# Patient Record
Sex: MALE | Race: WHITE | NOT HISPANIC OR LATINO | Employment: FULL TIME | URBAN - METROPOLITAN AREA
[De-identification: names, ages, dates, MRNs, and addresses within clinical notes are randomized per-mention and may not be internally consistent; named-entity substitution may affect disease eponyms.]

---

## 2016-12-21 ASSESSMENT — ACTIVITIES OF DAILY LIVING (ADL)
ARE_THERE_CARBON_MONOXIDE_DETECTORS_IN_YOUR_HOME?: Y
DO_MEMBERS_OF_YOUR_HOUSEHOLD_WEAR_SEAT_BELTS?: Y
ARE_THERE_SMOKE_DETECTORS_IN_YOUR_HOME?: Y
DO_MEMBERS_OF_YOUR_HOUSEHOLD_USE_SUNSCREEN?: Y
ARE_THERE_FIREARMS_IN_YOUR_HOME?: N
DO_MEMBERS_OF_YOUR_HOUSEHOLD_USE_SAFETY_HELMETS?: N

## 2017-01-04 ENCOUNTER — OFFICE VISIT (OUTPATIENT)
Dept: INTERNAL MEDICINE | Facility: CLINIC | Age: 57
End: 2017-01-04

## 2017-01-04 VITALS
BODY MASS INDEX: 30.51 KG/M2 | HEIGHT: 69 IN | HEART RATE: 58 BPM | WEIGHT: 206 LBS | RESPIRATION RATE: 16 BRPM | DIASTOLIC BLOOD PRESSURE: 84 MMHG | SYSTOLIC BLOOD PRESSURE: 129 MMHG

## 2017-01-04 DIAGNOSIS — Z11.59 NEED FOR HEPATITIS C SCREENING TEST: ICD-10-CM

## 2017-01-04 DIAGNOSIS — Z13.220 SCREENING CHOLESTEROL LEVEL: ICD-10-CM

## 2017-01-04 DIAGNOSIS — Z11.59 NEED FOR HEPATITIS C SCREENING TEST: Primary | ICD-10-CM

## 2017-01-04 DIAGNOSIS — Z23 NEED FOR PROPHYLACTIC VACCINATION AND INOCULATION AGAINST INFLUENZA: ICD-10-CM

## 2017-01-04 LAB
CHOLEST SERPL-MCNC: 206 MG/DL
HCV AB SERPL QL IA: NORMAL
HDLC SERPL-MCNC: 50 MG/DL
LDLC SERPL CALC-MCNC: 136 MG/DL
NONHDLC SERPL-MCNC: 156 MG/DL
TRIGL SERPL-MCNC: 102 MG/DL

## 2017-01-04 ASSESSMENT — PAIN SCALES - GENERAL: PAINLEVEL: NO PAIN (0)

## 2017-01-04 NOTE — PATIENT INSTRUCTIONS
Primary Care Center Medication Refill Request Information:  * Please contact your pharmacy regarding ANY request for medication refills.  ** [facility] Prescription Fax = [phone]  * Please allow 3 business days for routine medication refills.  * Please allow 5 business days for controlled substance medication refills.     Primary Care Center Test Result notification information:  *You will be notified with in 7-10 days of your appointment day regarding the results of your test.  If you are on MyChart you will be notified as soon as the provider has reviewed the results and signed off on them.

## 2017-01-04 NOTE — MR AVS SNAPSHOT
After Visit Summary   1/4/2017    Bernabe Dykes    MRN: [id number]           Patient Information     Date Of Birth          1960        Visit Information        Provider Department      1/4/2017 7:40 AM Josue Todd MD [facility]        Today's Diagnoses     Need for hepatitis C screening test    -  1     Need for prophylactic vaccination and inoculation against influenza         Screening cholesterol level           Care Instructions    Primary Care Center Medication Refill Request Information:  * Please contact your pharmacy regarding ANY request for medication refills.  ** [facility] Prescription Fax = [phone]  * Please allow 3 business days for routine medication refills.  * Please allow 5 business days for controlled substance medication refills.     Primary Care Center Test Result notification information:  *You will be notified with in 7-10 days of your appointment day regarding the results of your test.  If you are on MyChart you will be notified as soon as the provider has reviewed the results and signed off on them.          Follow-ups after your visit        Your next 10 appointments already scheduled     Jan 04, 2017  8:30 AM   LAB with [facility] ([facility])    [address] [place] [address] [phone]           Patient must bring picture ID.  Patient should be prepared to give a urine specimen  Please do not eat 10-12 hours before your appointment if you are coming in fasting for labs on lipids, cholesterol, or glucose (sugar).  Pregnant women should follow their Care Team instructions. Water with medications is okay. Do not drink coffee or other fluids.   If you have concerns about taking  your medications, please ask at office or if scheduling via [employer], send a message by clicking on Secure Messaging, Message Your Care Team.              Future tests that were ordered for you  "today     Open Future Orders        Priority Expected Expires Ordered    Hepatitis C Screen Reflex to HCV RNA Quant and Genotype Routine 1/4/2017 1/4/2018 1/4/2017    Lipid panel reflex to direct LDL Routine  1/4/2018 1/4/2017            Who to contact     Please call your clinic at [phone] to:    Ask questions about your health    Make or cancel appointments    Discuss your medicines    Learn about your test results    Speak to your doctor   If you have compliments or concerns about an experience at your clinic, or if you wish to file a complaint, please contact [facility] Patient Relations at [phone] or email us at [email or web address]         Additional Information About Your Visit        [employer] Information     [employer] gives you secure access to your electronic health record. If you see a primary care provider, you can also send messages to your care team and make appointments. If you have questions, please call your primary care clinic.  If you do not have a primary care provider, please call [phone] and they will assist you.      [employer] is an electronic gateway that provides easy, online access to your medical records. With [employer], you can request a clinic appointment, read your test results, renew a prescription or communicate with your care team.     To access your existing account, please contact your [facility] or call [phone] for assistance.        Care EveryWhere ID     This is your Care EveryWhere ID. This could be used by other organizations to access your [place] medical records  [id number]        Your Vitals Were     Pulse Respirations Height BMI (Body Mass Index)          58 16 1.765 m (5' 9.49\") 29.99 kg/m2         Blood Pressure from Last 3 Encounters:   01/04/17 129/84   03/16/16 138/93   12/31/15 129/85    Weight from Last 3 Encounters:   01/04/17 93.441 kg (206 lb)   03/16/16 96.163 kg (212 lb) "   12/31/15 95.709 kg (211 lb)              We Performed the Following     FLU VACCINE, 3 YRS +, IM  [[id number]]        Primary Care Provider Office Phone # Fax #    Michael Ruff [phone]        [facility] [address] [facility] [address]  [facility] [address]        Thank you!     Thank you for choosing [facility]  for your care. Our goal is always to provide you with excellent care. Hearing back from our patients is one way we can continue to improve our services. Please take a few minutes to complete the written survey that you may receive in the mail after your visit with us. Thank you!             Your Updated Medication List - Protect others around you: Learn how to safely use, store and throw away your medicines at www.disposemymeds.org.          This list is accurate as of: 1/4/17  8:02 AM.  Always use your most recent med list.                   Brand Name Dispense Instructions for use    aspirin 81 MG EC tablet     90 tablet    Take 1 tablet (81 mg) by mouth daily

## 2017-01-04 NOTE — NURSING NOTE
Chief Complaint   Patient presents with     Physical     Pt is here for a physical.      Michaela Farmer LPN January 4, 2017 7:36 AM

## 2017-01-04 NOTE — NURSING NOTE
"Administered flu vaccine to patient. Patient tolerated procedure well. See immunization records for details.   FLU VACCINE QUESTIONNAIRE:  Ask the following questions of all parties who want influenza vaccination:     CONTRAINDICATIONS  1.  Is the patient age less than 6 months?  NO  2.  Has the person to be vaccinated ever had Guillain-Carpio syndrome? NO  3.  Has the person to be vaccinated had the vaccine this year? NO  4.  Is the person to be vaccinated sick today? NO  5.  Does the person to be vaccinated have an allergy to eggs or a component of the vaccine? NO  6.  Has the person to vaccinated ever had a serious reaction to an influenza vaccination in the past? NO    If the answer to ALL of the above questions is \"No\", then please administer the influenza vaccine per the standard protocol.  If the patient answered \"Yes\" to questions 1 or 2, do not administer the vaccine. If the patient answered \"Yes\" to question 3, do not administer the vaccine unless the patient is a child receiving the vaccine in two doses. If the patient answered \"Yes\" to questions 4, 5, and/or 6, get additional details on sickness and/or reaction and refer to provider. If you have any questions regarding contraindications, please refer to the provider.                                                         INFLUENZA VACCINATION NOTE      Information sheet given to patient and questions answered.   INDICATION FOR VACCINATION:  Anyone from 6 months of age or older.    MELBA Farmer LPN     "

## 2017-01-04 NOTE — PROGRESS NOTES
[facility] Resident Note    Date of visit: January 4, 2017    Reason for visit: Physical    HPI: Bernabe Dykes is a 56 year old male with a history of unprovoked DVT in 2005 and 2012 who presents to clinic for physical.     No complaints today.    Last seen by Dr. Todd on 3/16/16 for snoring that was thought to be related to nasal obstruction and possible septal deviation. Advised to try Breathe Right strips and fluticasone. CT scan of sinuses was ordered but not completed. Patient notes that snoring resolved in the spring and he thought that it might have been due to seasonal/spring allergies. Since the spring, he has not been using Breathe Right strips or fluticasone. Also, during the 3/16/16 visit, he noted having reduced libido, increased latency or erections, and difficulty maintaining erections. Afternoon/early evening testosterone was checked and was normal. Today, no concerns with libido or ED. Notes that reduced libido and ED could have been due to the increased stress that he had in the spring.    Review Of Systems  Answers for HPI/ROS submitted by the patient on 12/21/2016   Annual Exam:  General Symptoms: No  Skin Symptoms: No  HENT Symptoms: No  EYE SYMPTOMS: No  HEART SYMPTOMS: No  LUNG SYMPTOMS: No  INTESTINAL SYMPTOMS: No  URINARY SYMPTOMS: No  REPRODUCTIVE SYMPTOMS: No  SKELETAL SYMPTOMS: No  BLOOD SYMPTOMS: No  NERVOUS SYSTEM SYMPTOMS: No  MENTAL HEALTH SYMPTOMS: No  Frequency of exercise:: 2-3 days/week  Duration of exercise:: 15-30 minutes        Past Medical History   Diagnosis Date     DVT of lower extremity (deep venous thrombosis) (H)      2005 and 2012     Past Surgical History   Procedure Laterality Date     Orthopedic surgery  1978     left knee repair     Orthopedic surgery       left wrist repair, metal      Colonoscopy  2/17/2014     Procedure: COLONOSCOPY;;  Surgeon: Nathan Olmedo MD;  Location:  GI     Social History     Social History     Marital Status:      Spouse  "Name: N/A     Number of Children: N/A     Years of Education: N/A     Occupational History     Not on file.     Social History Main Topics     Smoking status: Never Smoker      Smokeless tobacco: Never Used     Alcohol Use: Yes      Comment: 10 glass of wine a week     Drug Use: No     Sexual Activity: Not on file     Other Topics Concern     Not on file     Social History Narrative     Family History   Problem Relation Age of Onset     Coronary Artery Disease Father 54     CABG, ICD     DIABETES Mother      Obesity Mother      Obesity Father      Current Outpatient Prescriptions   Medication Sig Dispense Refill     aspirin 81 MG EC tablet Take 1 tablet (81 mg) by mouth daily 90 tablet 3     No Known Allergies    Physical Exam:   Vitals: /84 mmHg  Pulse 58  Resp 16  Ht 1.765 m (5' 9.49\")  Wt 93.441 kg (206 lb)  BMI 29.99 kg/m2  Gen: Alert, oriented, pleasant, NAD  HEENT: NC/AT, PERRL, EOMI, fundoscopic exam showed normal blood vessels without hemorrhage or exudates, moist mucous membranes, oropharynx w/o erythema, exudate, or lesions. Clear TM bilaterally.  Neck: No cervical lymphadenopathy, no thyromegaly. No carotid bruits.  CV: RRR, normal S1 and S2, no murmurs, 2+ bilateral radial pulses  Respiratory: CTAB, good air movement bilaterally, no wheezes, rales, or rhonchi  Abdomen: Soft, NTND. BS present. No palpable masses. No hepatosplenomegaly.  Back: No vertebral body tenderness  /Rectal: No inguinal hernias. Normal prostate, no palpable masses  Extremities: No LE edema. No knee joint swelling. 5/5 bilateral  strength and 5/5 bilateral upper extremity strength  Neuro: CN II-XII grossly intact, moves all extremities. 2+ bilateral patellar reflexes. Normal heel and toe walking. Negative Romberg.  Skin: No visible rashes      Assessment/Plan:   Bernabe Dykes is a 56 year old male with a history of unprovoked DVT in 2005 and 2012 who presents to clinic for physical and has no " complaints.    Screening cholesterol level: Last lipid panel in 12/2015. 10-year ASCVD 5.7%. Discussed starting a statin with patient in 3/2016 for borderline HLD, which he declined. Will recheck fasting lipid panel today.  -     Lipid panel reflex to direct LDL; Future    Possible seasonal allergies: Had snoring in the spring, possibly due to seasonal allergies and nasal congestion. Snoring improved with Breathe Right strips and fluticasone and when the season changed. Advised patient to try using fluticasone earlier in the spring, a little before onset of symptoms.      Health maintenance:   Need for hepatitis C screening test  -     Hepatitis C Screen Reflex to HCV RNA Quant and Genotype; Future  Need for prophylactic vaccination and inoculation against influenza  -     FLU VACCINE, 3 YRS +, IM  [34274]  Advance Directive: Patient notes that he has a legal copy of an advance directive. Advised patient to bring a copy to clinic the next time he has a visit.    RTC in 1 year.      Patient was seen and discussed with my attending physician, Dr. Todd.       Kerline Bautista MD, PhD PGY1  Internal Medicine  Pager: [phone]  Pt was seen and examined with Dr. Bautista.  I agree with her documentation as noted above.    My additional comments: None    Josue Todd

## 2017-09-25 ENCOUNTER — OFFICE VISIT (OUTPATIENT)
Dept: INTERNAL MEDICINE | Facility: CLINIC | Age: 57
End: 2017-09-25

## 2017-09-25 VITALS — HEART RATE: 79 BPM | DIASTOLIC BLOOD PRESSURE: 91 MMHG | SYSTOLIC BLOOD PRESSURE: 131 MMHG

## 2017-09-25 DIAGNOSIS — H92.01 EAR PAIN, RIGHT: Primary | ICD-10-CM

## 2017-09-25 RX ORDER — NEOMYCIN SULFATE, POLYMYXIN B SULFATE, HYDROCORTISONE 3.5; 10000; 1 MG/ML; [USP'U]/ML; MG/ML
3 SOLUTION/ DROPS AURICULAR (OTIC) 4 TIMES DAILY
Qty: 10 ML | Refills: 0 | Status: SHIPPED | OUTPATIENT
Start: 2017-09-25 | End: 2017-10-03

## 2017-09-25 ASSESSMENT — PAIN SCALES - GENERAL: PAINLEVEL: NO PAIN (1)

## 2017-09-25 NOTE — PATIENT INSTRUCTIONS
[facility]: [phone]     [facility] Medication Refill Request Information:  * Please contact your pharmacy regarding ANY request for medication refills.  ** [facility] Prescription Fax = [phone]  * Please allow 3 business days for routine medication refills.  * Please allow 5 business days for controlled substance medication refills.     [facility] Test Result notification information:  *You will be notified with in 7-10 days of your appointment day regarding the results of your test.  If you are on MyChart you will be notified as soon as the provider has reviewed the results and signed off on them.

## 2017-09-25 NOTE — PROGRESS NOTES
HPI:   Bernabe Dykes is a 57 year old male presents today for followup. He is generally healthy other than an unprovoked DVT many years back which is only on aspirin. For the last week he's noticed some right ear pain. It hurts near the opening of the ear canal and then he noted some pain and swelling in the retroauricular area. No deep ear pain no fevers or chills no sore throat or other URI symptoms uses a Q-tip sometimes in his ear but not very often he's not placed anything else in his ear. He wasn't was in [place] for business recently traveling. He's otherwise been feeling well. He is a nonsmoker. No hearing loss or tinnitus no discharge from his ear      ASSESSMENT/PLAN: #1 right ear pain on exam -he hat area of irritation in the external auditory canal that is near the pinna inferiorly. He is painful in this area; the rest of the external auditory canal appears normal and his tympanic membrane does not suggest any acute otitis media. He has small amount of swelling retroauricularly with what feels like a small lymph node that is somewhat tender to touch; there is no overlying redness. He has no other cervical lymphadenopathy. Oropharynx is clear. I'm going to treat this as if this is otitis externa with Corticosporin otic solutions. I asked most likely the retroauricular lymph node is inflammation related to this. I've asked him to follow up in 1 week for recheck if he still having symptoms and if things have resolved he does not need to return. He should call back if symptoms worsen before the next week.            Patient Active Problem List   Diagnosis     Skin exam, screening for cancer       Current Outpatient Prescriptions   Medication Sig Dispense Refill     aspirin 81 MG EC tablet Take 1 tablet (81 mg) by mouth daily 90 tablet 3         ROS:    Constitutional: no fevers, chill   Cardiovascular: no chest pain, palpitations  Respiratory: no dyspnea, cough, shortness of breath or wheezing   GI: no  nausea, vomiting, diarrhea, no abdominal pain   Musculoskeletal: no edema       PHYSICAL EXAM:   BP (!) 131/91  Pulse 79   Wt Readings from Last 1 Encounters:   01/04/17 93.4 kg (206 lb)       Constitutional: no distress, comfortable, pleasant    Left TM normal, EAC normal. Right TM normal. ARea of irritation and erosion in proximal EAC inferiorly, area of tenderness. Small retroauricular lymphnode that is tender to touch, minimally swollen, no overlying redness.  OP clear  No cervical lymphadenopathy  Cardiovascular: regular rate and rhythm, normal S1 and S2, no murmurs, rubs or gallops  Respiratory: clear to auscultation, no wheezes or crackles, normal breath sounds   Musculoskeletal:  no edema       Teena Chaudhry MD

## 2017-09-25 NOTE — MR AVS SNAPSHOT
After Visit Summary   9/25/2017    Bernabe Dykes    MRN: [id number]           Patient Information     Date Of Birth          1960        Visit Information        Provider Department      9/25/2017 6:30 PM Teena Chaudhry MD [facility]        Today's Diagnoses     Ear pain, right    -  1      Care Instructions    Primary Care Center: [phone]     Primary Care Center Medication Refill Request Information:  * Please contact your pharmacy regarding ANY request for medication refills.  ** [facility] Prescription Fax = [phone]  * Please allow 3 business days for routine medication refills.  * Please allow 5 business days for controlled substance medication refills.     Primary Care Center Test Result notification information:  *You will be notified with in 7-10 days of your appointment day regarding the results of your test.  If you are on MyChart you will be notified as soon as the provider has reviewed the results and signed off on them.            Follow-ups after your visit        Follow-up notes from your care team     Return in about 1 week (around 10/2/2017).      Your next 10 appointments already scheduled     Oct 03, 2017  3:00 PM CDT   (Arrive by 2:45 PM)   Return Visit with Real Gonzales DO   [facility] ([facility])    [address] [place] [address] [phone]              Who to contact     Please call your clinic at [phone] to:    Ask questions about your health    Make or cancel appointments    Discuss your medicines    Learn about your test results    Speak to your doctor   If you have compliments or concerns about an experience at your clinic, or if you wish to file a complaint, please contact [facility] Patient Relations at [phone] or email us at [email or web address]         Additional Information About Your Visit        MyChart  Information     [employer] gives you secure access to your electronic health record. If you see a primary care provider, you can also send messages to your care team and make appointments. If you have questions, please call your primary care clinic.  If you do not have a primary care provider, please call [phone] and they will assist you.      [employer] is an electronic gateway that provides easy, online access to your medical records. With [employer], you can request a clinic appointment, read your test results, renew a prescription or communicate with your care team.     To access your existing account, please contact your [facility] or call [phone] for assistance.        Care EveryWhere ID     This is your Care EveryWhere ID. This could be used by other organizations to access your [place] medical records  [id number]        Your Vitals Were     Pulse                   79            Blood Pressure from Last 3 Encounters:   09/25/17 (!) 131/91   01/04/17 129/84   03/16/16 (!) 138/93    Weight from Last 3 Encounters:   01/04/17 93.4 kg (206 lb)   03/16/16 96.2 kg (212 lb)   12/31/15 95.7 kg (211 lb)              Today, you had the following     No orders found for display         Today's Medication Changes          These changes are accurate as of: 9/25/17  7:07 PM.  If you have any questions, ask your nurse or doctor.               Start taking these medicines.        Dose/Directions    neomycin-polymyxin-hydrocortisone otic solution   Commonly known as:  CORTISPORIN   Used for:  Ear pain, right   Started by:  Teena Chaudhry MD        Dose:  3 drop   Place 3 drops in ear(s) 4 times daily   Quantity:  10 mL   Refills:  0            Where to get your medicines      These medications were sent to [facility] [id number] [place] [address] & [facility]  [address] [place] [address]     Phone:  [phone]      neomycin-polymyxin-hydrocortisone otic solution                Primary Care Provider Office Phone # Fax #    Michael Ruff [phone]       [address] [place] [address]        Equal Access to Services     DESIREE DENTON : Barbie antonio sandieo Sogilbertoali, waaxda luqadaha, qaybta kaalmada adeegyada, rosalinda short gardenia moise. [facility] [phone].    ATENCIÓN: Si habla español, tiene a hidalgo disposición servicios gratuitos de asistencia lingüística. Llame al [phone].    We comply with applicable federal civil rights laws and [place] laws. We do not discriminate on the basis of race, color, national origin, age, disability sex, sexual orientation or gender identity.            Thank you!     Thank you for choosing [facility]  for your care. Our goal is always to provide you with excellent care. Hearing back from our patients is one way we can continue to improve our services. Please take a few minutes to complete the written survey that you may receive in the mail after your visit with us. Thank you!             Your Updated Medication List - Protect others around you: Learn how to safely use, store and throw away your medicines at www.disposemymeds.org.          This list is accurate as of: 9/25/17  7:07 PM.  Always use your most recent med list.                   Brand Name Dispense Instructions for use Diagnosis    aspirin 81 MG EC tablet     90 tablet    Take 1 tablet (81 mg) by mouth daily    Routine health maintenance       neomycin-polymyxin-hydrocortisone otic solution    CORTISPORIN    10 mL    Place 3 drops in ear(s) 4 times daily    Ear pain, right

## 2017-09-25 NOTE — NURSING NOTE
Chief Complaint   Patient presents with     Otalgia     Patient here for right ear pain x7-10 days.

## 2017-09-27 ENCOUNTER — OFFICE VISIT (OUTPATIENT)
Dept: FAMILY MEDICINE | Facility: CLINIC | Age: 57
End: 2017-09-27

## 2017-09-27 VITALS
HEART RATE: 61 BPM | RESPIRATION RATE: 18 BRPM | WEIGHT: 211.2 LBS | OXYGEN SATURATION: 97 % | DIASTOLIC BLOOD PRESSURE: 88 MMHG | SYSTOLIC BLOOD PRESSURE: 137 MMHG | TEMPERATURE: 97.8 F | BODY MASS INDEX: 30.75 KG/M2

## 2017-09-27 DIAGNOSIS — K11.21 PAROTITIS, ACUTE: Primary | ICD-10-CM

## 2017-09-27 ASSESSMENT — PAIN SCALES - GENERAL: PAINLEVEL: MILD PAIN (2)

## 2017-09-27 NOTE — PROGRESS NOTES
SUBJECTIVE:    Pt is a 57 year old male with pmh of     Patient Active Problem List   Diagnosis     Skin exam, screening for cancer       who is here for evaluation of had concerns including Ear Problem.    See Dr Chaudhry note. Tried the ear drops. Ear still irritated feeling, and dull discomfort, not really ear ache, now also in front of ear along w/ some new swelling in front of ear. At first visit, he just noted a external ear canal soreness, maybe from wearing ear buds on long flight from [place] but unsure, and a little sore spot under/behind ear. No swimming.    No systemic sx, No runny nose/sore throat. No L sx.    No Known Allergies        Current Outpatient Prescriptions   Medication Sig Dispense Refill     amoxicillin-clavulanate (AUGMENTIN) 875-125 MG per tablet Take 1 tablet by mouth 2 times daily 20 tablet 0     aspirin 81 MG EC tablet Take 1 tablet (81 mg) by mouth daily 90 tablet 3     neomycin-polymyxin-hydrocortisone (CORTISPORIN) otic solution Place 3 drops in ear(s) 4 times daily (Patient not taking: Reported on 9/27/2017) 10 mL 0       Social History   Substance Use Topics     Smoking status: Never Smoker     Smokeless tobacco: Never Used     Alcohol use Yes      Comment: 10 glass of wine a week         OBJECTIVE:  /88  Pulse 61  Temp 97.8  F (36.6  C) (Oral)  Resp 18  Wt 95.8 kg (211 lb 3.2 oz)  SpO2 97%  BMI 30.75 kg/m2  GENERAL APPEARANCE: Alert, no acute distress  EYES: PERRL, EOM normal, conjunctiva and lids normal  HENT: Ears and TMs normal on left, on right ear canal mildly red diffusely, slight white drg in areas, TM clear, oral mucosa and posterior oropharynx normal. Right ear tragus sliglty swollen and tender, and just under, under/behind, and in front of right ear the skin is normal but mildly swollen and tender, I could not palpate discrete areas of tenderness nor mass/nodule  NECK: No adenopathy,masses or thyromegaly  NEURO: Alert, oriented, speech and mentation  normal  PSYCHE: mentation appears normal, affect and mood normal    ASSESSMENT/PLAN:    Ext otitits right--whether the appearance is due to the drops, or an underlying condition, is unclear. He'll stop them.    Swollen/tender tissue around right ear; the area just in front of the ear, and under, could be his parotid if mildly inflamed. But the canal and tragus would be separate. Possiblities are parotid gland infection, if so likely bacterial as just one side, and/or ext otitis s/ spreading soft tissue infection. Uptodate on parotid gland infection rvwd. Does not need IV. Will start w/ more limited coverage, full po would be clinda plus cipro. Will start w/ Augmentin.    He has f/u planned in five days. If not better, consider CT and/or ENT to see him.    BJ MARTINEZ MD

## 2017-09-27 NOTE — PATIENT INSTRUCTIONS
[facility]: [phone]     [facility] Medication Refill Request Information:  * Please contact your pharmacy regarding ANY request for medication refills.  ** [facility] Prescription Fax = [phone]  * Please allow 3 business days for routine medication refills.  * Please allow 5 business days for controlled substance medication refills.     [facility] Test Result notification information:  *You will be notified with in 7-10 days of your appointment day regarding the results of your test.  If you are on MyChart you will be notified as soon as the provider has reviewed the results and signed off on them.

## 2017-09-27 NOTE — MR AVS SNAPSHOT
After Visit Summary   9/27/2017    Bernabe Dykes    MRN: [id number]           Patient Information     Date Of Birth          1960        Visit Information        Provider Department      9/27/2017 12:05 PM Rosalio Zuleta MD [facility]        Today's Diagnoses     Parotitis, acute    -  1      Care Instructions    Primary Care Center: [phone]     Primary [facility] Center Medication Refill Request Information:  * Please contact your pharmacy regarding ANY request for medication refills.  ** [facility] Prescription Fax = [phone]  * Please allow 3 business days for routine medication refills.  * Please allow 5 business days for controlled substance medication refills.     [facility] Center Test Result notification information:  *You will be notified with in 7-10 days of your appointment day regarding the results of your test.  If you are on MyChart you will be notified as soon as the provider has reviewed the results and signed off on them.            Follow-ups after your visit        Your next 10 appointments already scheduled     Oct 03, 2017  3:00 PM CDT   (Arrive by 2:45 PM)   Return Visit with DO PENNY Mock [facility] ([facility])    [address] [place] [id number] [phone]              Who to contact     Please call your clinic at [phone] to:    Ask questions about your health    Make or cancel appointments    Discuss your medicines    Learn about your test results    Speak to your doctor   If you have compliments or concerns about an experience at your clinic, or if you wish to file a complaint, please contact [facility] Patient Relations at [phone] or email us at [email or web address]         Additional Information About Your Visit        MyChart Information     [employer] gives you secure access to your electronic health record. If  you see a primary care provider, you can also send messages to your care team and make appointments. If you have questions, please call your primary care clinic.  If you do not have a primary care provider, please call [phone] and they will assist you.      [employer] is an electronic gateway that provides easy, online access to your medical records. With [employer], you can request a clinic appointment, read your test results, renew a prescription or communicate with your care team.     To access your existing account, please contact your [facility] or call [phone] for assistance.        Care EveryWhere ID     This is your Care EveryWhere ID. This could be used by other organizations to access your [place] medical records  [id number]        Your Vitals Were     Pulse Temperature Respirations Pulse Oximetry BMI (Body Mass Index)       61 97.8  F (36.6  C) (Oral) 18 97% 30.75 kg/m2        Blood Pressure from Last 3 Encounters:   09/27/17 137/88   09/25/17 (!) 131/91   01/04/17 129/84    Weight from Last 3 Encounters:   09/27/17 95.8 kg (211 lb 3.2 oz)   01/04/17 93.4 kg (206 lb)   03/16/16 96.2 kg (212 lb)              Today, you had the following     No orders found for display         Today's Medication Changes          These changes are accurate as of: 9/27/17 12:58 PM.  If you have any questions, ask your nurse or doctor.               Start taking these medicines.        Dose/Directions    amoxicillin-clavulanate 875-125 MG per tablet   Commonly known as:  AUGMENTIN   Used for:  Parotitis, acute   Started by:  Rosalio Zuleta MD        Dose:  1 tablet   Take 1 tablet by mouth 2 times daily   Quantity:  20 tablet   Refills:  0            Where to get your medicines      These medications were sent to [place], [place] - [address] [address]  [address] [place] [address] [address] [place] [address]    Hours:  TRANSPLANT PHONE NUMBER  [phone] Phone:  [phone]     amoxicillin-clavulanate 875-125 MG per tablet                Primary Care Provider Office Phone # Fax #    Josue Todd [phone]       [address] [place] [address]        Equal Access to Services     DESIREE DENTON : Hadii aad ku hadasho Soomaali, waaxda luqadaha, qaybta kaalmada adeegyada, waxay idiin hayaan adeeg kharash la'aan . So [facility] [phone].    ATENCIÓN: Si habla español, tiene a hidalgo disposición servicios gratuitos de asistencia lingüística. Mauriame al [phone].    We comply with applicable federal civil rights laws and [place] laws. We do not discriminate on the basis of race, color, national origin, age, disability sex, sexual orientation or gender identity.            Thank you!     Thank you for choosing [facility]  for your care. Our goal is always to provide you with excellent care. Hearing back from our patients is one way we can continue to improve our services. Please take a few minutes to complete the written survey that you may receive in the mail after your visit with us. Thank you!             Your Updated Medication List - Protect others around you: Learn how to safely use, store and throw away your medicines at www.disposemymeds.org.          This list is accurate as of: 9/27/17 12:58 PM.  Always use your most recent med list.                   Brand Name Dispense Instructions for use Diagnosis    amoxicillin-clavulanate 875-125 MG per tablet    AUGMENTIN    20 tablet    Take 1 tablet by mouth 2 times daily    Parotitis, acute       aspirin 81 MG EC tablet     90 tablet    Take 1 tablet (81 mg) by mouth daily    Routine health maintenance       neomycin-polymyxin-hydrocortisone otic solution    CORTISPORIN    10 mL    Place 3 drops in ear(s) 4 times daily    Ear pain, right

## 2017-09-27 NOTE — NURSING NOTE
Chief Complaint   Patient presents with     Ear Problem     Patient is here to discuss a right ear problem. Pt got ear drop and since then it has gotten worse. Right side of face is swollen.      Clarice Lei LPN at 12:05 PM on 9/27/2017.

## 2017-10-03 ENCOUNTER — OFFICE VISIT (OUTPATIENT)
Dept: INTERNAL MEDICINE | Facility: CLINIC | Age: 57
End: 2017-10-03

## 2017-10-03 VITALS — BODY MASS INDEX: 31.17 KG/M2 | RESPIRATION RATE: 16 BRPM | WEIGHT: 214.1 LBS

## 2017-10-03 DIAGNOSIS — H69.91 DYSFUNCTION OF RIGHT EUSTACHIAN TUBE: Primary | ICD-10-CM

## 2017-10-03 RX ORDER — FLUTICASONE PROPIONATE 50 MCG
1 SPRAY, SUSPENSION (ML) NASAL DAILY
Qty: 1 BOTTLE | Refills: 1 | Status: SHIPPED | OUTPATIENT
Start: 2017-10-03 | End: 2018-01-17

## 2017-10-03 ASSESSMENT — PAIN SCALES - GENERAL: PAINLEVEL: MILD PAIN (2)

## 2017-10-03 NOTE — NURSING NOTE
Chief Complaint   Patient presents with     Ear Problem     pt is here to follow up on right ear pain       Alondra Vidales CMA at 3:04 PM on 10/3/2017

## 2017-10-03 NOTE — MR AVS SNAPSHOT
After Visit Summary   10/3/2017    Bernabe Dykes    MRN: [id number]           Patient Information     Date Of Birth          1960        Visit Information        Provider Department      10/3/2017 3:00 PM Real Gonzales DO [facility]        Today's Diagnoses     Dysfunction of right eustachian tube    -  1      Care Instructions    Continue your Augmentin to complete total course of 10 days and use Flonase once per day for 7 days.  For your right sided jaw swelling, continue gentle massage and sucking on hard candy. Please follow up in 1 week if symptoms do not resolve.     Primary Care Center Phone Number [phone]  Primary Care Center Medication Refill Request Information:  * Please contact your pharmacy regarding ANY request for medication refills.  ** [facility] Prescription Fax = [phone]  * Please allow 3 business days for routine medication refills.  * Please allow 5 business days for controlled substance medication refills.     Primary Care Center Test Result notification information:  *You will be notified with in 7-10 days of your appointment day regarding the results of your test.  If you are on MyChart you will be notified as soon as the provider has reviewed the results and signed off on them.                  Follow-ups after your visit        Follow-up notes from your care team     Return in about 1 week (around 10/10/2017) for Possible parotitis vs eustasian tube dysfunction.      Your next 10 appointments already scheduled     Oct 10, 2017  2:00 PM CDT   (Arrive by 1:45 PM)   Return Visit with DO PENNY Mock [facility] ([facility])    [address] [place] [address] [phone]              Who to contact     Please call your clinic at [phone] to:    Ask questions about your health    Make or cancel appointments    Discuss your medicines    Learn about your test  results    Speak to your doctor   If you have compliments or concerns about an experience at your clinic, or if you wish to file a complaint, please contact [facility] Patient Relations at [phone] or email us at [email or web address]         Additional Information About Your Visit        [employer] Information     [employer] gives you secure access to your electronic health record. If you see a primary care provider, you can also send messages to your care team and make appointments. If you have questions, please call your primary care clinic.  If you do not have a primary care provider, please call [phone] and they will assist you.      [employer] is an electronic gateway that provides easy, online access to your medical records. With [employer], you can request a clinic appointment, read your test results, renew a prescription or communicate with your care team.     To access your existing account, please contact your [facility] or call [phone] for assistance.        Care EveryWhere ID     This is your Care EveryWhere ID. This could be used by other organizations to access your [place] medical records  [id number]        Your Vitals Were     Respirations BMI (Body Mass Index)                16 31.17 kg/m2           Blood Pressure from Last 3 Encounters:   10/03/17 (P) 131/89   09/27/17 137/88   09/25/17 (!) 131/91    Weight from Last 3 Encounters:   10/03/17 97.1 kg (214 lb 1.6 oz)   09/27/17 95.8 kg (211 lb 3.2 oz)   01/04/17 93.4 kg (206 lb)              Today, you had the following     No orders found for display         Today's Medication Changes          These changes are accurate as of: 10/3/17  3:57 PM.  If you have any questions, ask your nurse or doctor.               Start taking these medicines.        Dose/Directions    fluticasone 50 MCG/ACT spray   Commonly known as:  FLONASE   Used for:  Dysfunction of right eustachian tube    Started by:  Real Gonzales,         Dose:  1 spray   Spray 1 spray into both nostrils daily Continue for 7 days   Quantity:  1 Bottle   Refills:  1         Stop taking these medicines if you haven't already. Please contact your care team if you have questions.     neomycin-polymyxin-hydrocortisone otic solution   Commonly known as:  CORTISPORIN   Stopped by:  Real Gonzales,                 Where to get your medicines      These medications were sent to [facility] [id number] [place] [address] [facility] [address]  [address] [place] [address]     Phone:  [phone]     fluticasone 50 MCG/ACT spray                Primary Care Provider Office Phone # Fax #    Josue Todd [phone]       [address] [place] [address]        Equal Access to Services     [facility]: Hadii yung antonio hadasho Somurali, waaxda luqadaha, qaybta kaalmada adeegyada, rosalinda varner . So [facility] [phone].    ATENCIÓN: Si habla español, tiene a hidalgo disposición servicios gratuitos de asistencia lingüística. Jaron al [phone].    We comply with applicable federal civil rights laws and [place] laws. We do not discriminate on the basis of race, color, national origin, age, disability, sex, sexual orientation, or gender identity.            Thank you!     Thank you for choosing [facility]  for your care. Our goal is always to provide you with excellent care. Hearing back from our patients is one way we can continue to improve our services. Please take a few minutes to complete the written survey that you may receive in the mail after your visit with us. Thank you!             Your Updated Medication List - Protect others around you: Learn how to safely use, store and throw away your medicines at www.disposemymeds.org.          This list is accurate as of: 10/3/17  3:57 PM.  Always use your most  recent med list.                   Brand Name Dispense Instructions for use Diagnosis    amoxicillin-clavulanate 875-125 MG per tablet    AUGMENTIN    20 tablet    Take 1 tablet by mouth 2 times daily    Parotitis, acute       aspirin 81 MG EC tablet     90 tablet    Take 1 tablet (81 mg) by mouth daily    Routine health maintenance       fluticasone 50 MCG/ACT spray    FLONASE    1 Bottle    Spray 1 spray into both nostrils daily Continue for 7 days    Dysfunction of right eustachian tube

## 2017-10-03 NOTE — PROGRESS NOTES
PRIMARY CARE CENTER       SUBJECTIVE:  Bernabe Dykes is a 57 year old male with a PMHx of dyslipidemia  who comes in for ongoing ear fullness and jaw swelling. Patient reports returning home from [place] 2-3 weeks ago where he used ear buds throughout his flight. He began developing post auricular tenderness and external ear irritation 2 weeks ago and was seen by Dr. Chaudhry on 9/25 where he received corticosporin otic drops. One day after using ear drops, he reports his post-auricular tenderness had resolved, but his ear fullness worsened significantly and he developed pre-auricular swelling. He was seen by Dr. Zuleta on 9/27 where ear drops were discontinued and the patient was started on a ten day course of Augmentin.     Since initiating Augmentin, patient has had mild relief of his ear fullness and jaw swelling. Currently he complains of right sided throat pain. He denies any fevers, chills, headaches, dysphagia, dry mouth, pain when chewing, gait instability, changes in hearing, tinnitus, nausea or vomiting.     Medications and allergies reviewed by me today.     ROS:   C: NEGATIVE for fever, chills, change in weight  I: NEGATIVE for worrisome rashes, moles or lesions  E: NEGATIVE for vision changes or irritation  ENT/MOUTH: Negative for auricular drainage, positive for right sided jaw swelling, sore throat, ear fullness  R: NEGATIVE for significant cough or SOB  B: NEGATIVE for masses, tenderness or discharge  CV: NEGATIVE for chest pain, palpitations or peripheral edema  GI: NEGATIVE for nausea, abdominal pain, heartburn, or change in bowel habits  : NEGATIVE for frequency, dysuria, or hematuria  M: NEGATIVE for significant arthralgias or myalgia  N: NEGATIVE for weakness, dizziness or paresthesias  E: NEGATIVE for temperature intolerance, skin/hair changes  H: NEGATIVE for bleeding problems  P: NEGATIVE for changes in mood or affect    OBJECTIVE:    BP (P) 131/89  Pulse (P) 74   Resp 16  Wt 97.1 kg (214 lb 1.6 oz)  BMI 31.17 kg/m2   Wt Readings from Last 1 Encounters:   10/03/17 97.1 kg (214 lb 1.6 oz)       GENERAL APPEARANCE: healthy, alert and no distress     EYES: EOMI,  PERRL     HENT: mild edema at the right angle of the mandible, no TTP, right TM appears retracted, non-erythematous     NECK: no adenopathy, no asymmetry, masses, or scars and thyroid normal to palpation     RESP: lungs clear to auscultation - no rales, rhonchi or wheezes     CV: regular rates and rhythm, normal S1 S2, no S3 or S4 and no murmur, click or rub     ABDOMEN:  soft, nontender, no HSM or masses and bowel sounds normal     MS: extremities normal- no gross deformities noted, no evidence of inflammation in joints, FROM in all extremities.     SKIN: no suspicious lesions or rashes     NEURO: Normal strength and tone, sensory exam grossly normal, mentation intact and speech normal     PSYCH: mentation appears normal. and affect normal/bright     LYMPHATICS: No axillary, cervical, or supraclavicular nodes     ASSESSMENT/PLAN:    # Right eustachian tube dysfunction  Patient reports returning from flight from [place] several weeks ago and continues to fly frequently. No prior history of ear dysfunction or infections. TMs appear non-erythematous bilaterally. Right TM appears retracted c/w eustachian tube dysfunction.   - Flonase 50 mcg/act spray x 7 days  - Complete 10 day course of Augmentin  - Follow up in 1 week if symptoms persist    # Possible parotitis   No recent medication changes. Dentition intact with no oral lesions. Will defer ENT consult at this time for possible parotitis given patients continued improvement on Augmentin. Follow up visit as above.   - Continued right sided jaw massage   - May consider ENT consult for further evaluation if symptoms persist     Pt should return to clinic for f/u with me in 1 week if symptoms persist.     Real Gonzales,   Internal Medicine, PGY1  10/3/2017    Oct 3,  2017    Pt was seen and plan of care discussed with Dr. Marcelo.

## 2017-10-03 NOTE — PATIENT INSTRUCTIONS
Continue your Augmentin to complete total course of 10 days and use Flonase once per day for 7 days.  For your right sided jaw swelling, continue gentle massage and sucking on hard candy. Please follow up in 1 week if symptoms do not resolve.     Primary Care Center Phone Number [phone]  Primary Care Center Medication Refill Request Information:  * Please contact your pharmacy regarding ANY request for medication refills.  ** [facility] Prescription Fax = [phone]  * Please allow 3 business days for routine medication refills.  * Please allow 5 business days for controlled substance medication refills.     [facility] Test Result notification information:  *You will be notified with in 7-10 days of your appointment day regarding the results of your test.  If you are on MyChart you will be notified as soon as the provider has reviewed the results and signed off on them.

## 2017-10-05 NOTE — PROGRESS NOTES
Attestation:  I, Wilma Canales, saw this patient with the resident and agree with the resident s findings and plan of care as documented in the resident s note.      Wilma Canales MD

## 2018-01-17 ENCOUNTER — OFFICE VISIT (OUTPATIENT)
Dept: INTERNAL MEDICINE | Facility: CLINIC | Age: 58
End: 2018-01-17
Payer: COMMERCIAL

## 2018-01-17 VITALS
WEIGHT: 213.1 LBS | BODY MASS INDEX: 30.51 KG/M2 | SYSTOLIC BLOOD PRESSURE: 123 MMHG | HEIGHT: 70 IN | TEMPERATURE: 97.7 F | HEART RATE: 78 BPM | DIASTOLIC BLOOD PRESSURE: 82 MMHG | OXYGEN SATURATION: 97 %

## 2018-01-17 DIAGNOSIS — E78.5 HYPERLIPIDEMIA, UNSPECIFIED HYPERLIPIDEMIA TYPE: ICD-10-CM

## 2018-01-17 DIAGNOSIS — R17 ELEVATED BILIRUBIN: ICD-10-CM

## 2018-01-17 DIAGNOSIS — E78.5 HYPERLIPIDEMIA, UNSPECIFIED HYPERLIPIDEMIA TYPE: Primary | ICD-10-CM

## 2018-01-17 LAB
ALBUMIN SERPL-MCNC: 4 G/DL (ref 3.4–5)
ALP SERPL-CCNC: 50 U/L (ref 40–150)
ALT SERPL W P-5'-P-CCNC: 26 U/L (ref 0–70)
ANION GAP SERPL CALCULATED.3IONS-SCNC: 5 MMOL/L (ref 3–14)
AST SERPL W P-5'-P-CCNC: 20 U/L (ref 0–45)
BILIRUB DIRECT SERPL-MCNC: 0.3 MG/DL (ref 0–0.2)
BILIRUB SERPL-MCNC: 2.1 MG/DL (ref 0.2–1.3)
BUN SERPL-MCNC: 12 MG/DL (ref 7–30)
CALCIUM SERPL-MCNC: 8.7 MG/DL (ref 8.5–10.1)
CHLORIDE SERPL-SCNC: 104 MMOL/L (ref 94–109)
CHOLEST SERPL-MCNC: 184 MG/DL
CO2 SERPL-SCNC: 29 MMOL/L (ref 20–32)
CREAT SERPL-MCNC: 1.05 MG/DL (ref 0.66–1.25)
GFR SERPL CREATININE-BSD FRML MDRD: 73 ML/MIN/1.7M2
GLUCOSE SERPL-MCNC: 106 MG/DL (ref 70–99)
HDLC SERPL-MCNC: 49 MG/DL
LDLC SERPL CALC-MCNC: 116 MG/DL
NONHDLC SERPL-MCNC: 134 MG/DL
POTASSIUM SERPL-SCNC: 4.6 MMOL/L (ref 3.4–5.3)
PROT SERPL-MCNC: 7.4 G/DL (ref 6.8–8.8)
SODIUM SERPL-SCNC: 138 MMOL/L (ref 133–144)
TRIGL SERPL-MCNC: 91 MG/DL

## 2018-01-17 ASSESSMENT — PAIN SCALES - GENERAL: PAINLEVEL: NO PAIN (0)

## 2018-01-17 NOTE — NURSING NOTE
Chief Complaint   Patient presents with     Physical     Patient here for annual physical.     Lisy Nevarez LPN at 7:29 AM on 1/17/2018.

## 2018-01-17 NOTE — MR AVS SNAPSHOT
After Visit Summary   1/17/2018    Bernabe Dykes    MRN: [id number]           Patient Information     Date Of Birth          1960        Visit Information        Provider Department      1/17/2018 7:40 AM Josue Todd MD [facility]        Today's Diagnoses     Hyperlipidemia, unspecified hyperlipidemia type    -  1    Elevated bilirubin           Follow-ups after your visit        Your next 10 appointments already scheduled     Jan 17, 2018  8:30 AM CST   LAB with  LAB   [facility] ([facility])    [address] [place] [id number] [phone]           Please do not eat 10-12 hours before your appointment if you are coming in fasting for labs on lipids, cholesterol, or glucose (sugar). This does not apply to pregnant women. Water, hot tea and black coffee (with nothing added) are okay. Do not drink other fluids, diet soda or chew gum.              Future tests that were ordered for you today     Open Future Orders        Priority Expected Expires Ordered    Hepatic panel Routine  1/17/2019 1/17/2018    Basic metabolic panel Routine  1/17/2019 1/17/2018    Lipid Profile Routine  1/17/2019 1/17/2018            Who to contact     Please call your clinic at [phone] to:    Ask questions about your health    Make or cancel appointments    Discuss your medicines    Learn about your test results    Speak to your doctor   If you have compliments or concerns about an experience at your clinic, or if you wish to file a complaint, please contact [facility] Patient Relations at [phone] or email us at [email or web address]         Additional Information About Your Visit        MyChart Information     [employer] gives you secure access to your electronic health record. If you see a primary care provider, you can also send messages to your care team and make appointments. If you  "have questions, please call your primary care clinic.  If you do not have a primary care provider, please call [phone] and they will assist you.      vufind is an electronic gateway that provides easy, online access to your medical records. With vufind, you can request a clinic appointment, read your test results, renew a prescription or communicate with your care team.     To access your existing account, please contact your [facility] or call [phone] for assistance.        Care EveryWhere ID     This is your Care EveryWhere ID. This could be used by other organizations to access your [facility] medical records  [id number]        Your Vitals Were     Pulse Temperature Height Pulse Oximetry BMI (Body Mass Index)       78 97.7  F (36.5  C) (Oral) 1.79 m (5' 10.47\") 97% 30.17 kg/m2        Blood Pressure from Last 3 Encounters:   01/17/18 123/82   10/03/17 (P) 131/89   09/27/17 137/88    Weight from Last 3 Encounters:   01/17/18 96.7 kg (213 lb 1.6 oz)   10/03/17 97.1 kg (214 lb 1.6 oz)   09/27/17 95.8 kg (211 lb 3.2 oz)               Primary Care Provider Office Phone # Fax #    Josue Todd [phone]       [address] [place] [id number]        Equal Access to Services     [facility] Hospital[facility] : Hadii yung antonio hadasho Somurali, waaxda luqadaha, qaybta kaalmada dasha, rosalinda varner . So [facility] [phone].    ATENCIÓN: Si habla español, tiene a hidalgo disposición servicios gratuitos de asistencia lingüística. Llame al [phone].    We comply with applicable federal civil rights laws and [place] laws. We do not discriminate on the basis of race, color, national origin, age, disability, sex, sexual orientation, or gender identity.            Thank you!     Thank you for choosing [facility]  for your care. Our goal is always to provide you with excellent care. Hearing back from our patients is " one way we can continue to improve our services. Please take a few minutes to complete the written survey that you may receive in the mail after your visit with us. Thank you!             Your Updated Medication List - Protect others around you: Learn how to safely use, store and throw away your medicines at www.disposemymeds.org.          This list is accurate as of: 1/17/18  8:11 AM.  Always use your most recent med list.                   Brand Name Dispense Instructions for use Diagnosis    aspirin 81 MG EC tablet     90 tablet    Take 1 tablet (81 mg) by mouth daily    Routine health maintenance

## 2018-01-17 NOTE — PROGRESS NOTES
"Chief complaint:  Bernabe Dykes is a 57 year old male presents for annual physical.      SUBJECTIVE:  Bernabe Dykes is a 57 year old male with past medical history of two prior unprovoked DVTs (2005 and 2012) and dyslipidemia who presents for annual physical. His last clinic visit was in 10/2017 for ear infection and possible parotitis, s/p antibiotic treatment. He reports that symptoms resolved fully, has no further concerns regarding this.     He reports doing well overall and has no medical concerns at this time. He is exercising regularly. Works with a  2x/week and exercises on his own at least several other days per week. Feels that he is doing well with regards to his diet. Reports eating very healthy foods but that he eats \"sporadically\". Diet high in fruits and vegetables, little red meat. He drinks alcohol several times per week, no tobacco or drug use. He received the flu vaccine this fall.       Allergies: No known allergies     Medications:   Aspirin 81 mg QD     SocHx:   Alcohol use - Varies, on average 4x/week   Tobacco use - Never used   Drug use - None     Active Ambulatory Problems     Diagnosis Date Noted     Skin exam, screening for cancer 01/30/2014     Resolved Ambulatory Problems     Diagnosis Date Noted     No Resolved Ambulatory Problems     Past Medical History:   Diagnosis Date     DVT of lower extremity (deep venous thrombosis) (H)      Hyperlipidemia        Review Of Systems   General:  NEGATIVE for fever, chills, change in weight  HEENT:  No changes in hearing or vision, no nose bleeds or other nasal problems and Negative for frequent or significant headaches  CV:  NEGATIVE for chest pain, palpitations or peripheral edema  Resp:  NEGATIVE for significant cough or SOB  GI:  NEGATIVE for abdominal pain, nausea, vomiting, change in bowel movements    :  NEGATIVE for frequency, dysuria, or hematuria  Musculoskeletal:  NEGATIVE for significant muscle or joint " "pains  Neurologic: NEGATIVE for headaches, numbness, tingling, or weakness  Psychiatric: No concerns with anxiety, depression or mental health      PE:  /82  Pulse 78  Temp 97.7  F (36.5  C) (Oral)  Ht 1.79 m (5' 10.47\")  Wt 96.7 kg (213 lb 1.6 oz)  SpO2 97%  BMI 30.17 kg/m2  Gen: No distress, comfortable, pleasant   HEENT: Eyes anicteric, normal extra-ocular movements, PERRLA. Oropharynx clear and moist without erythema or exudate. Bilateral tympanic membranes non-erythematous, no retraction or bulging. Nasal passages patent and non-erythematous. No submandibular, submental, preauricular, postauricular, cervical, or supraclavicular lymphadenopathy. Thyroid not enlarged.   Cardiovascular: Regular rate and rhythm, normal S1 and S2, no murmurs, rubs or gallops. Peripheral pulses full and symmetric.   Respiratory: Clear to auscultation, no wheezes or crackles. Non-labored breathing on room air.    Gastrointestinal: Positive bowel sounds, nontender, nondistended.   : No inguinal hernias palpable. No testicular masses palpable. Prostate non-enlarged and smooth bilaterally.   Neuro: CNs II - XII intact. Normal Romberg. Normal toe-walking, heel-walking, and gait. Reflexes 2+ throughout.   Skin: No concerning lesions, no jaundice.   Psychological: Appropriate mood.       ASSESSMENT/PLAN:  Bernabe Dykes is a 57 year old male with PMH of hyperlipidemia and prior unprovoked DVTs who presents for annual physical. He reports doing well overall and has no medical concerns at this time. Physical exam today was unremarkable, and he is up to date on immunizations and health maintenance screenings.     Hyperlipidemia, unspecified hyperlipidemia type - History of hyperlipidemia, not managed with medication. At his last visit in 1/2017, his 10 year ASCVD risk was 6.4%. He wanted to avoid starting medications and planned to focus on his diet and exercise. Will plan to re-check lipid profile today.   - Lipid Profile  - " Basic metabolic panel    Elevated bilirubin - Elevated bilirubin noted on prior labs. Will re-check today.   - Hepatic panel      Cadence Canas, MS4   The medical student acted as scribe and the encounter documented above was completely performed by myself.  Supervising Doctor Josue Todd

## 2019-01-28 ENCOUNTER — OFFICE VISIT (OUTPATIENT)
Dept: INTERNAL MEDICINE | Facility: CLINIC | Age: 59
End: 2019-01-28
Payer: COMMERCIAL

## 2019-01-28 VITALS
RESPIRATION RATE: 20 BRPM | SYSTOLIC BLOOD PRESSURE: 134 MMHG | BODY MASS INDEX: 27.85 KG/M2 | WEIGHT: 196.7 LBS | HEART RATE: 65 BPM | DIASTOLIC BLOOD PRESSURE: 84 MMHG | OXYGEN SATURATION: 97 %

## 2019-01-28 DIAGNOSIS — E78.49 OTHER HYPERLIPIDEMIA: ICD-10-CM

## 2019-01-28 DIAGNOSIS — Z00.00 ROUTINE HEALTH MAINTENANCE: ICD-10-CM

## 2019-01-28 DIAGNOSIS — R73.02 IGT (IMPAIRED GLUCOSE TOLERANCE): ICD-10-CM

## 2019-01-28 DIAGNOSIS — R17 ELEVATED BILIRUBIN: Primary | ICD-10-CM

## 2019-01-28 DIAGNOSIS — R17 ELEVATED BILIRUBIN: ICD-10-CM

## 2019-01-28 LAB
ALBUMIN SERPL-MCNC: 3.6 G/DL (ref 3.4–5)
ALP SERPL-CCNC: 52 U/L (ref 40–150)
ALT SERPL W P-5'-P-CCNC: 25 U/L (ref 0–70)
ANION GAP SERPL CALCULATED.3IONS-SCNC: 4 MMOL/L (ref 3–14)
AST SERPL W P-5'-P-CCNC: 22 U/L (ref 0–45)
BASOPHILS # BLD AUTO: 0.1 10E9/L (ref 0–0.2)
BASOPHILS NFR BLD AUTO: 1.3 %
BILIRUB SERPL-MCNC: 1.6 MG/DL (ref 0.2–1.3)
BUN SERPL-MCNC: 12 MG/DL (ref 7–30)
CALCIUM SERPL-MCNC: 8.6 MG/DL (ref 8.5–10.1)
CHLORIDE SERPL-SCNC: 106 MMOL/L (ref 94–109)
CHOLEST SERPL-MCNC: 150 MG/DL
CO2 SERPL-SCNC: 31 MMOL/L (ref 20–32)
CREAT SERPL-MCNC: 1.09 MG/DL (ref 0.66–1.25)
DIFFERENTIAL METHOD BLD: NORMAL
EOSINOPHIL # BLD AUTO: 0.2 10E9/L (ref 0–0.7)
EOSINOPHIL NFR BLD AUTO: 3.1 %
ERYTHROCYTE [DISTWIDTH] IN BLOOD BY AUTOMATED COUNT: 12.4 % (ref 10–15)
GFR SERPL CREATININE-BSD FRML MDRD: 74 ML/MIN/{1.73_M2}
GLUCOSE SERPL-MCNC: 100 MG/DL (ref 70–99)
HBA1C MFR BLD: 5.5 % (ref 0–5.6)
HCT VFR BLD AUTO: 48.1 % (ref 40–53)
HDLC SERPL-MCNC: 44 MG/DL
HGB BLD-MCNC: 15.8 G/DL (ref 13.3–17.7)
IMM GRANULOCYTES # BLD: 0 10E9/L (ref 0–0.4)
IMM GRANULOCYTES NFR BLD: 0.2 %
LDLC SERPL CALC-MCNC: 86 MG/DL
LYMPHOCYTES # BLD AUTO: 2 10E9/L (ref 0.8–5.3)
LYMPHOCYTES NFR BLD AUTO: 38.5 %
MCH RBC QN AUTO: 29.3 PG (ref 26.5–33)
MCHC RBC AUTO-ENTMCNC: 32.8 G/DL (ref 31.5–36.5)
MCV RBC AUTO: 89 FL (ref 78–100)
MONOCYTES # BLD AUTO: 0.5 10E9/L (ref 0–1.3)
MONOCYTES NFR BLD AUTO: 9.4 %
NEUTROPHILS # BLD AUTO: 2.5 10E9/L (ref 1.6–8.3)
NEUTROPHILS NFR BLD AUTO: 47.5 %
NONHDLC SERPL-MCNC: 106 MG/DL
NRBC # BLD AUTO: 0 10*3/UL
NRBC BLD AUTO-RTO: 0 /100
PLATELET # BLD AUTO: 318 10E9/L (ref 150–450)
POTASSIUM SERPL-SCNC: 4.5 MMOL/L (ref 3.4–5.3)
PROT SERPL-MCNC: 7 G/DL (ref 6.8–8.8)
RBC # BLD AUTO: 5.4 10E12/L (ref 4.4–5.9)
SODIUM SERPL-SCNC: 141 MMOL/L (ref 133–144)
TRIGL SERPL-MCNC: 99 MG/DL
WBC # BLD AUTO: 5.2 10E9/L (ref 4–11)

## 2019-01-28 RX ORDER — MULTIVITAMIN,THERAPEUTIC
1 TABLET ORAL DAILY
Qty: 90 TABLET | Refills: 3 | Status: SHIPPED | OUTPATIENT
Start: 2019-01-28 | End: 2020-01-28

## 2019-01-28 ASSESSMENT — PAIN SCALES - GENERAL: PAINLEVEL: NO PAIN (0)

## 2019-01-28 NOTE — PROGRESS NOTES
HPI  58-year-old presents today for physical examination.  He is been doing well.  He travels extensively but he continues to work out and exercise regularly uses a  in town couple of days a week and when exercising he uses the [employer] for this.  He said no chest pain dyspnea exercise intolerance or other problems in association with this.  He has a mild tremor.  This is not interfere with any of his activities but his girlfriend has commented on it and he was asking about it.  Otherwise he has no complaints or concerns.  He drinks couple glasses of wine several days a week he is eating a healthy diet.    Past and Family hx reviewed and updated    Past Medical History:   Diagnosis Date     DVT of lower extremity (deep venous thrombosis) (H)     2005 and 2012     Hyperlipidemia      Other hyperlipidemia 1/28/2019     Past Surgical History:   Procedure Laterality Date     COLONOSCOPY  2/17/2014    Procedure: COLONOSCOPY;;  Surgeon: Nathan Olmedo MD;  Location:  GI     ORTHOPEDIC SURGERY  1978    left knee repair     ORTHOPEDIC SURGERY      left wrist repair, metal      Family History   Problem Relation Age of Onset     Coronary Artery Disease Father 54        CABG, ICD     Obesity Father      Diabetes Mother      Obesity Mother      Social History     Socioeconomic History     Marital status:      Spouse name: None     Number of children: None     Years of education: None     Highest education level: None   Social Needs     Financial resource strain: None     Food insecurity - worry: None     Food insecurity - inability: None     Transportation needs - medical: None     Transportation needs - non-medical: None   Occupational History     None   Tobacco Use     Smoking status: Never Smoker     Smokeless tobacco: Never Used   Substance and Sexual Activity     Alcohol use: Yes     Comment: 10 glass of wine a week     Drug use: No     Sexual activity: None   Other Topics Concern      Parent/sibling w/ CABG, MI or angioplasty before 65F 55M? Not Asked   Social History Narrative     None     Answers for HPI/ROS submitted by the patient on 1/15/2019   General Symptoms: No  Skin Symptoms: No  HENT Symptoms: No  EYE SYMPTOMS: No  HEART SYMPTOMS: No  LUNG SYMPTOMS: No  INTESTINAL SYMPTOMS: No  URINARY SYMPTOMS: No  REPRODUCTIVE SYMPTOMS: No  SKELETAL SYMPTOMS: No  BLOOD SYMPTOMS: No  NERVOUS SYSTEM SYMPTOMS: No  MENTAL HEALTH SYMPTOMS: No    Exam:  /84 (BP Location: Right arm, Patient Position: Sitting, Cuff Size: Adult Large)   Pulse 65   Resp 20   Wt 89.2 kg (196 lb 11.2 oz)   SpO2 97%   BMI 27.85 kg/m    196 lbs 11.2 oz  Physical Exam   The patient is alert, oriented with a clear sensorium.   Skin shows no lesions or rashes and good turgor.   Head is normocephalic and atraumatic.   Eyes show PERRLA with benign optic fundi.   Ears show normal TMs bilaterally.   Mouth shows clear oral mucosa.   Neck shows no nodes, thyromegaly or bruits.   Back is non tender.  Lungs are clear to percussion and auscultation.   Heart shows normal S1 and S2 without murmur or gallop.   Abdomen is soft and nontender without masses or organomegaly.   Genitalia show normal testes. No evidence of inguinal hernia.  Rectal shows small smooth prostate without nodules or masses.  Extremities show no edema and no evidence of active synovitis.   Neurologic examination shows very fine resting tremor, cranial nerves II-XII intact. Motor shows 5/5 strength. Reflexes are symmetric. Cerebellar testing shows normal tandem gait.  Romberg negative.    ASSESSMENT  1 benign resting tremor  2 hyperlipidemia needs reevaluation  3 impaired glucose tolerance  4 elevated bilirubin needs reassessment    Plan  We will update his immunizations with a flu shot give him lab studies today have him reassessed in a year.    This note was completed using Dragon voice recognition software.  Although reviewed after completion, some word and  grammatical errors may occur.    Josue Todd MD  General Internal Medicine  Primary Care Center  [phone]

## 2019-01-28 NOTE — NURSING NOTE
Flu vaccine Questioners:     1.  Has the patient received the information for the influenza vaccine? YES    2.  Does the patient have any of the following contraindications?     Allergy to eggs? No     Allergic reaction to previous influenza vaccines? No     Any other problems to previous influenza vaccines? No     Paralyzed by Guillain-[place] syndrome? No     Currently pregnant? NO     Current moderate or severe illness? No     Allergy to contact lens solution? No    3.  The vaccine has been administered in the usual fashion and the patient was instructed to wait 20 minutes before leaving the building in the event of an allergic reaction: YES      Administered Influenza Fluzone Quadrivalent vaccine (see Immunizations in Chart Review). Patient tolerated well.          Farooq Huffman CMA at 7:30 AM on 1/28/2019

## 2019-01-28 NOTE — NURSING NOTE
Chief Complaint   Patient presents with     Physical     annual physical   Ana Charles LPN 6:51 AM on 1/28/2019    Has not been screened for HIV.Ana Charles LPN 6:53 AM on 1/28/2019

## 2019-04-30 ENCOUNTER — OFFICE VISIT (OUTPATIENT)
Dept: INTERNAL MEDICINE | Facility: CLINIC | Age: 59
End: 2019-04-30
Payer: COMMERCIAL

## 2019-04-30 VITALS
DIASTOLIC BLOOD PRESSURE: 92 MMHG | HEART RATE: 63 BPM | SYSTOLIC BLOOD PRESSURE: 142 MMHG | BODY MASS INDEX: 28.13 KG/M2 | WEIGHT: 198.7 LBS | OXYGEN SATURATION: 97 %

## 2019-04-30 DIAGNOSIS — R09.81 NASAL CONGESTION: ICD-10-CM

## 2019-04-30 DIAGNOSIS — H92.02 EAR PAIN, LEFT: Primary | ICD-10-CM

## 2019-04-30 RX ORDER — FLUTICASONE PROPIONATE 50 MCG
1 SPRAY, SUSPENSION (ML) NASAL DAILY
Qty: 15.8 ML | Refills: 1 | Status: SHIPPED | OUTPATIENT
Start: 2019-04-30 | End: 2020-01-08

## 2019-04-30 ASSESSMENT — PAIN SCALES - GENERAL: PAINLEVEL: NO PAIN (0)

## 2019-04-30 NOTE — PROGRESS NOTES
PRIMARY CARE CENTER       SUBJECTIVE:  Bernabe Dykes is a 58 year old male with a PMHx of HLD who presents to discuss symptoms related to otitis media.     Patient was diagnosed with left otitis media approximately 9 days ago.  He initially had pain, fullness, and decreased hearing in the left ear.  He has taken the medication daily since diagnosis and is on day 9 of 10 of Augmentin.  His pain has decreased significantly but he has on going fullness in his left ear, as well as decreased hearing.  He denies fever, chills, sore throat, drainage from the ear, nausea, vomiting, dyspnea, numbness or tingling along the face.    He has had a ear infections previously, last one being approximately 10 years ago.    Medications and allergies reviewed by me today.       ROS:   7-point ROS otherwise negative except for that noted in Subjective.       CURRENT MEDICATIONS:  Medication List:   Current Outpatient Medications   Medication Sig     aspirin 81 MG EC tablet Take 1 tablet (81 mg) by mouth daily     fluticasone (FLONASE) 50 MCG/ACT nasal spray Spray 1 spray into both nostrils daily     multivitamin, therapeutic (THERA-VIT) TABS tablet Take 1 tablet by mouth daily     No current facility-administered medications for this visit.        OBJECTIVE:  BP (!) 142/92   Pulse 63   Wt 90.1 kg (198 lb 11.2 oz)   SpO2 97%   BMI 28.13 kg/m     Wt Readings from Last 1 Encounters:   04/30/19 90.1 kg (198 lb 11.2 oz)   GENERAL: well-appearing, alert and no distress  EYES: EOMI, sclera-nonicteric  HENT: NCAT, oropharynx without ulcers or lesions  - R ear canal and TM wnl  - L ear canal normal, TM is mildly erythematous and opaque   NECK: no cervical lymphadenopathy, no asymmetry, masses, or scars  RESP: normal respiratory effort on room air, CTAB - no rales, rhonchi or wheezes  CV: regular rate and rhythm, normal S1 S2, no S3 or S4, no murmur, click or rub  MS/Ext: WWP  SKIN: no suspicious lesions or rashes  on exposed skin   NEURO: AOx3, spontaneous movement of all extremities  - L ear: Rhinne test - bone conduction > air conduction  PSYCH: interactive, affect normal/bright, speech normal, mentation and judgment appear normal  LYMPHATICS: no cervical, supraclavicular adenopathy      ASSESSMENT/PLAN:  Bernabe was seen today for ear problem. Diagnoses and all orders for this visit:    Ear pain, left  Nasal congestion  Patient presenting with ongoing left ear pressure and decreased hearing after diagnosis of otitis media.  Do not suspect undertreated infection, as patient does not have recurrent fevers or ear drainage.  Likely resolving inflammation of tympanic membrane that is leading to current symptoms.  Prescribing fluticasone to promote sinus drainage.  -     fluticasone (FLONASE) 50 MCG/ACT nasal spray; Spray 1 spray into both nostrils daily  - Finish 10-day course of Augmentin  - If symptoms do not improve in 2 weeks, Audiology consult       Questions and concerns were addressed. Bernabe SINGLETON Jania participated in decision making and agrees with the above plan.    Jacque Webster MD, PhD  Internal Medicine, PGY-1  Pager: [phone]    Apr 30, 2019    Patient was seen/plan of care discussed with Dr. Todd.  Pt was seen and examined with Dr. Webster.  I agree with her documentation as noted above.    My additional comments: None    Josue Todd

## 2019-04-30 NOTE — PATIENT INSTRUCTIONS
Primary Care Center Phone Number [phone]  Primary Care Center Medication Refill Request Information:  * Please contact your pharmacy regarding ANY request for medication refills.  ** [facility] Prescription Fax = [phone]  * Please allow 3 business days for routine medication refills.  * Please allow 5 business days for controlled substance medication refills.     Primary Care Center Test Result notification information:  *You will be notified with in 7-10 days of your appointment day regarding the results of your test.  If you are on MyChart you will be notified as soon as the provider has reviewed the results and signed off on them.              [facility]         Internal Medicine Resident                   Continuity Clinic    Who We Are    Resident Continuity Clinic is a part of the [facility].  Resident physicians see patients independently and establish a relationship with them over the course of their three-year residency program.  As with the Primary Care Clinic, our Resident Continuity Clinic models a group practice.  If your doctor is not available, you will be able to see another resident physician.  At the end of a resident s training, patients will be transitioned to a new resident physician for ongoing care.     We treat patients with a wide array of medical needs from routine physicals, to acute illnesses, to diabetes and blood pressure management, to complex medical illness.  What is a Resident Physician?    Resident physicians hold medical degrees and are doctors. They are training to become specialists in Internal Medicine. They work under the supervision of board-certified faculty physicians.  Expectations for Your Care    We strive to provide accessible, quality care at all times.    In order to provide this care, it is best to see your primary care resident doctor consistently rather switching between providers.  In the event you do see another physician, you  should schedule a follow-up visit with your usual primary care doctor.    If you are transitioning your care from another clinic, it is helpful to have your records available for your doctor to review.    We do not prescribe controlled substances, such as ADD medications or narcotic pain medications, on your first visit.  We will review your health records and concerns prior to devising a treatment plan with you in order to provide the best care.      Clinic Services     Extended clinic hours; patient  to help navigate your visit;  parking; laboratory and imaging services with evening and weekend hours    Multiple medical and surgical specialties in one building    Complementary services, including Nutrition, Integrative Medicine, Pharmacy consultations, Mental and Behavioral Health, Sports Medicine and Physical Therapy    Thank You    We would like to thank you for choosing the [facility] Resident Continuity Clinic for your primary care. You are making a priceless contribution to the training of the next generation of health care practitioners.     Contact us at [phone] for appointments or questions.    Resident Clinic Hours are Tuesdays and Thursdays, 7:30am-5:00pm    Residents   Gary Bhatti MD  (Male)   Mary Anne Abreu MD (Female)  Alondra Keane MD   (Female)   Vee Marc MD   (Female)   Sin Meier MD  (Male)   Ney Mccrayr MD  (Male)   Miriam Nava MD    (Female)   Kike Chase MD  (Male)   Dhaval Byers MD  (Male)    Yun Klnie MD  (Female)   Hoang Almaguer MD  (Male)   You Webster MD  (Female)   Adrian Laguna MD   (Female)   Real Gonzales MD  (Male)   Samy Batista MD  (Male)   Nye Phillips MD (Male)   Marcos Rutledge MD  (Male)   Melina Oropeza MD (Female)    Lesli Pedroza MD (Female)   Evon Cottrell MD  (Male)   Kim Candelario MD    (Female)   Noni Giordano MD  (Female)    Supervising  Physicians   MD Wilma Stokes, MD Reji Azul, MD Josue Todd, MD Shital Douglass, MD Suzy Mercado, MD Michael Ruff, MD Darleen Pan MD

## 2019-04-30 NOTE — NURSING NOTE
"Chief Complaint   Patient presents with     Ear Problem     Pt is having discomfort, pressure and hearing loss from left ear.        Vital signs:      BP: (!) 142/92 Pulse: 63     SpO2: 97 %       Weight: 90.1 kg (198 lb 11.2 oz)  Estimated body mass index is 28.13 kg/m  as calculated from the following:    Height as of 1/17/18: 1.79 m (5' 10.47\").    Weight as of this encounter: 90.1 kg (198 lb 11.2 oz).    Liane Jimenez CMA  2:26 PM 4/30/2019    "

## 2019-09-30 ENCOUNTER — HEALTH MAINTENANCE LETTER (OUTPATIENT)
Age: 59
End: 2019-09-30

## 2020-01-08 ENCOUNTER — OFFICE VISIT (OUTPATIENT)
Dept: INTERNAL MEDICINE | Facility: CLINIC | Age: 60
End: 2020-01-08
Payer: COMMERCIAL

## 2020-01-08 VITALS
HEART RATE: 62 BPM | BODY MASS INDEX: 28.74 KG/M2 | WEIGHT: 203 LBS | SYSTOLIC BLOOD PRESSURE: 131 MMHG | OXYGEN SATURATION: 99 % | DIASTOLIC BLOOD PRESSURE: 83 MMHG | RESPIRATION RATE: 16 BRPM

## 2020-01-08 DIAGNOSIS — Z00.00 ROUTINE HEALTH MAINTENANCE: ICD-10-CM

## 2020-01-08 DIAGNOSIS — E78.5 HYPERLIPIDEMIA, UNSPECIFIED HYPERLIPIDEMIA TYPE: ICD-10-CM

## 2020-01-08 DIAGNOSIS — R17 ELEVATED BILIRUBIN: ICD-10-CM

## 2020-01-08 DIAGNOSIS — R73.02 IGT (IMPAIRED GLUCOSE TOLERANCE): ICD-10-CM

## 2020-01-08 DIAGNOSIS — R73.02 IGT (IMPAIRED GLUCOSE TOLERANCE): Primary | ICD-10-CM

## 2020-01-08 LAB
ALBUMIN SERPL-MCNC: 3.9 G/DL (ref 3.4–5)
ALP SERPL-CCNC: 53 U/L (ref 40–150)
ALT SERPL W P-5'-P-CCNC: 32 U/L (ref 0–70)
ANION GAP SERPL CALCULATED.3IONS-SCNC: 3 MMOL/L (ref 3–14)
AST SERPL W P-5'-P-CCNC: 16 U/L (ref 0–45)
BILIRUB DIRECT SERPL-MCNC: 0.3 MG/DL (ref 0–0.2)
BILIRUB SERPL-MCNC: 1.5 MG/DL (ref 0.2–1.3)
BUN SERPL-MCNC: 15 MG/DL (ref 7–30)
CALCIUM SERPL-MCNC: 8.9 MG/DL (ref 8.5–10.1)
CHLORIDE SERPL-SCNC: 108 MMOL/L (ref 94–109)
CHOLEST SERPL-MCNC: 202 MG/DL
CO2 SERPL-SCNC: 30 MMOL/L (ref 20–32)
CREAT SERPL-MCNC: 1.03 MG/DL (ref 0.66–1.25)
GFR SERPL CREATININE-BSD FRML MDRD: 79 ML/MIN/{1.73_M2}
GLUCOSE SERPL-MCNC: 99 MG/DL (ref 70–99)
HBA1C MFR BLD: 5.2 % (ref 0–5.6)
HDLC SERPL-MCNC: 57 MG/DL
HIV 1+2 AB+HIV1 P24 AG SERPL QL IA: NONREACTIVE
LDLC SERPL CALC-MCNC: 128 MG/DL
NONHDLC SERPL-MCNC: 145 MG/DL
POTASSIUM SERPL-SCNC: 4.3 MMOL/L (ref 3.4–5.3)
PROT SERPL-MCNC: 7.4 G/DL (ref 6.8–8.8)
SODIUM SERPL-SCNC: 141 MMOL/L (ref 133–144)
TRIGL SERPL-MCNC: 84 MG/DL

## 2020-01-08 ASSESSMENT — PAIN SCALES - GENERAL: PAINLEVEL: NO PAIN (0)

## 2020-01-08 NOTE — PROGRESS NOTES
HPI  59-year-old presents today for physical examination.  He has been doing well continues to workout aggressively twice a week with a .  He tolerates this well no chest pain dyspnea or other complaints.  His diet was indiscrete over the holidays but it is better now with a focus on fruits and vegetables in the diet.  He is maintaining his weight.  He has a history of elevated indirect bili without symptoms or other abnormal liver function studies.  We discussed his borderline blood sugar the importance of maintaining fitness and weight control.  Past Medical History:   Diagnosis Date     DVT of lower extremity (deep venous thrombosis) (H)     2005 and 2012     Hyperlipidemia      IGT (impaired glucose tolerance)      Past Surgical History:   Procedure Laterality Date     COLONOSCOPY  2/17/2014    Procedure: COLONOSCOPY;;  Surgeon: Nathan Olmedo MD;  Location:  GI     ORTHOPEDIC SURGERY  1978    left knee repair     ORTHOPEDIC SURGERY      left wrist repair, metal      Family History   Problem Relation Age of Onset     Coronary Artery Disease Father 54        CABG, ICD     Obesity Father      Diabetes Father      Diabetes Mother      Obesity Mother      Social History     Socioeconomic History     Marital status:      Spouse name: None     Number of children: None     Years of education: None     Highest education level: None   Occupational History     None   Social Needs     Financial resource strain: None     Food insecurity:     Worry: None     Inability: None     Transportation needs:     Medical: None     Non-medical: None   Tobacco Use     Smoking status: Never Smoker     Smokeless tobacco: Never Used   Substance and Sexual Activity     Alcohol use: Yes     Comment: 10 glass of wine a week     Drug use: No     Sexual activity: None   Lifestyle     Physical activity:     Days per week: None     Minutes per session: None     Stress: None   Relationships     Social connections:     Talks on  phone: None     Gets together: None     Attends Jew service: None     Active member of club or organization: None     Attends meetings of clubs or organizations: None     Relationship status: None     Intimate partner violence:     Fear of current or ex partner: None     Emotionally abused: None     Physically abused: None     Forced sexual activity: None   Other Topics Concern     Parent/sibling w/ CABG, MI or angioplasty before 65F 55M? Not Asked   Social History Narrative     None     Answers for HPI/ROS submitted by the patient on 12/26/2019   General Symptoms: No  Skin Symptoms: No  HENT Symptoms: No  EYE SYMPTOMS: No  HEART SYMPTOMS: No  LUNG SYMPTOMS: No  INTESTINAL SYMPTOMS: No  URINARY SYMPTOMS: No  REPRODUCTIVE SYMPTOMS: No  SKELETAL SYMPTOMS: No  BLOOD SYMPTOMS: No  NERVOUS SYSTEM SYMPTOMS: No  MENTAL HEALTH SYMPTOMS: No    Exam:  /83   Pulse 62   Resp 16   Wt 92.1 kg (203 lb)   SpO2 99%   BMI 28.74 kg/m    203 lbs 0 oz  Physical Exam   The patient is alert, oriented with a clear sensorium.   Skin shows no lesions or rashes and good turgor.   Head is normocephalic and atraumatic.   Eyes show PERRLA with benign optic fundi.   Ears show normal TMs bilaterally.   Mouth shows clear oral mucosa.   Neck shows no nodes, thyromegaly or bruits.   Back is non tender.  Lungs are clear to percussion and auscultation.   Heart shows normal S1 and S2 without murmur or gallop.   Abdomen is soft and nontender without masses or organomegaly.   Genitalia show normal testes. No evidence of inguinal hernia.  Rectal shows small smooth prostate without nodules or masses.  Extremities show no edema and no evidence of active synovitis.   Neurologic examination shows cranial nerves II-XII intact. Motor shows 5/5 strength. Reflexes are symmetric. Cerebellar testing shows normal tandem gait.  Romberg negative.    ASSESSMENT  1 impaired glucose tolerance needs reassessment  2 hyperlipidemia needs reassessment  3  elevated bilirubin likely Gilbert's syndrome  4 family history of diabetes    Plan  We will reassess his labs today reinforced his diet and exercise regimen and will plan to have him follow-up for reassessment in a year.    This note was completed using Dragon voice recognition software.  Although reviewed after completion, some word and grammatical errors may occur.    Josue Todd MD  General Internal Medicine  [facility]  [phone]

## 2020-01-08 NOTE — NURSING NOTE
Chief Complaint   Patient presents with     Physical     Pt is here for annual physical.      Clarice Lei LPN at 7:42 AM on 1/8/2020.

## 2020-01-08 NOTE — LETTER
Patient:  Bernabe Dykes  :   1960  MRN:     [id number]        Mr. Bernabe Dykes  [address] [address]        2020    Dear Mr. Dykes,    Thank you for choosing the [facility] for your healthcare needs.  We appreciate the opportunity to serve you.    The following are your recent test results.       There is no evidence of infection with HIV.   If you have any questions regarding this don't hesitate to contact us.     Results for orders placed or performed in visit on 20   Hemoglobin A1c     Status: None   Result Value Ref Range    Hemoglobin A1C 5.2 0 - 5.6 %   Lipid Profile     Status: Abnormal   Result Value Ref Range    Cholesterol 202 (H) <200 mg/dL    Triglycerides 84 <150 mg/dL    HDL Cholesterol 57 >39 mg/dL    LDL Cholesterol Calculated 128 (H) <100 mg/dL    Non HDL Cholesterol 145 (H) <130 mg/dL   Comprehensive metabolic panel     Status: Abnormal   Result Value Ref Range    Sodium 141 133 - 144 mmol/L    Potassium 4.3 3.4 - 5.3 mmol/L    Chloride 108 94 - 109 mmol/L    Carbon Dioxide 30 20 - 32 mmol/L    Anion Gap 3 3 - 14 mmol/L    Glucose 99 70 - 99 mg/dL    Urea Nitrogen 15 7 - 30 mg/dL    Creatinine 1.03 0.66 - 1.25 mg/dL    GFR Estimate 79 >60 mL/min/[1.73_m2]    GFR Estimate If Black >90 >60 mL/min/[1.73_m2]    Calcium 8.9 8.5 - 10.1 mg/dL    Bilirubin Total 1.5 (H) 0.2 - 1.3 mg/dL    Albumin 3.9 3.4 - 5.0 g/dL    Protein Total 7.4 6.8 - 8.8 g/dL    Alkaline Phosphatase 53 40 - 150 U/L    ALT 32 0 - 70 U/L    AST 16 0 - 45 U/L   Bilirubin direct     Status: Abnormal   Result Value Ref Range    Bilirubin Direct 0.3 (H) 0.0 - 0.2 mg/dL   Results for orders placed or performed in visit on 20   HIV Screening     Status: None   Result Value Ref Range    HIV Antigen Antibody Combo Nonreactive NR^Nonreactive             Please contact your provider if you have any questions or concerns.  We  look forward to serving your needs in the future.      Sincerely,    Dr. Todd

## 2020-01-08 NOTE — PATIENT INSTRUCTIONS
[facility] Medication Refill Request Information:  * Please contact your pharmacy regarding ANY request for medication refills.  ** [facility] Prescription Fax = [phone]  * Please allow 3 business days for routine medication refills.  * Please allow 5 business days for controlled substance medication refills.     [facility] Test Result notification information:  *You will be notified with in 7-10 days of your appointment day regarding the results of your test.  If you are on MyChart you will be notified as soon as the provider has reviewed the results and signed off on them.    [facility]: [phone]

## 2020-01-21 ENCOUNTER — ANCILLARY PROCEDURE (OUTPATIENT)
Dept: ULTRASOUND IMAGING | Facility: CLINIC | Age: 60
End: 2020-01-21
Attending: INTERNAL MEDICINE
Payer: COMMERCIAL

## 2020-01-21 ENCOUNTER — OFFICE VISIT (OUTPATIENT)
Dept: INTERNAL MEDICINE | Facility: CLINIC | Age: 60
End: 2020-01-21
Payer: COMMERCIAL

## 2020-01-21 VITALS
HEART RATE: 56 BPM | WEIGHT: 203.4 LBS | SYSTOLIC BLOOD PRESSURE: 142 MMHG | BODY MASS INDEX: 28.8 KG/M2 | RESPIRATION RATE: 17 BRPM | DIASTOLIC BLOOD PRESSURE: 87 MMHG

## 2020-01-21 DIAGNOSIS — Z86.718 HISTORY OF DEEP VENOUS THROMBOSIS: Primary | ICD-10-CM

## 2020-01-21 DIAGNOSIS — Z86.718 HISTORY OF DEEP VENOUS THROMBOSIS: ICD-10-CM

## 2020-01-21 DIAGNOSIS — M71.22 SYNOVIAL CYST OF LEFT POPLITEAL SPACE: ICD-10-CM

## 2020-01-21 DIAGNOSIS — I82.402 RECURRENT DEEP VEIN THROMBOSIS (DVT) OF LEFT LOWER EXTREMITY (H): ICD-10-CM

## 2020-01-21 LAB — RADIOLOGIST FLAGS: ABNORMAL

## 2020-01-21 ASSESSMENT — PAIN SCALES - GENERAL: PAINLEVEL: MODERATE PAIN (4)

## 2020-01-21 NOTE — PROGRESS NOTES
HPI:  Bernabe Dykes is a 59 year old male presents for   Chief Complaint   Patient presents with     Deep Vein Thrombosis     Here for possible DVT on left lower leg      Patient was last seen in clinic (Dr. Todd) on 01/08/2020 for routine annual physical. Today, patient reports that he has been having constant, dull pain in his left calf for about a week. He first noticed his symptoms sometime last week while waking up in the morning and it has been persistent since then.He does not report  or recall any trauma or associated injury to the leg. He reports the pain as a dull ache that is constant and radiates upto his left knee popliteal fossa. He does report some increased swelling in the left calf, but denies any redness,tightness, or incresed pain with any activity or particular position. He has tried using ibuprofen for the pain but states that it has not been very helpful. He denies any recent fever, chills, illness, SOB, chest pain, joint pain, rashes, lesions, or any other associated symptoms.   Mr. Dykes has a history of two blood clots in the past, one superficial in the lower extremity and one DVT in his R leg. He reports that these episodes were years ago, and he was treated with warfarin for 6-9 months for the DVT. He has not been on any anti-coag since the last episode.He reports that this episode of left calf pain feels similar to his previous episode of DVT and is concerned about recurrence.     Past Medical History:   Diagnosis Date     DVT of lower extremity (deep venous thrombosis) (H)     2005 and 2012     Hyperlipidemia      IGT (impaired glucose tolerance)      Past Surgical History:   Procedure Laterality Date     COLONOSCOPY  2/17/2014    Procedure: COLONOSCOPY;;  Surgeon: Nathan Olmedo MD;  Location:  GI     ORTHOPEDIC SURGERY  1978    left knee repair     ORTHOPEDIC SURGERY      left wrist repair, metal      Family History   Problem Relation Age of Onset     Coronary Artery  Disease Father 54         CABG, ICD     Obesity Father       Diabetes Father       Diabetes Mother       Obesity Mother       Socioeconomic History     Marital status:        Spouse name: None     Number of children: None     Years of education: None     Highest education level: None   Occupational History     None   Social Needs     Financial resource strain: None     Food insecurity:       Worry: None       Inability: None     Transportation needs:       Medical: None       Non-medical: None   Tobacco Use     Smoking status: Never Smoker     Smokeless tobacco: Never Used   Substance and Sexual Activity     Alcohol use: Yes       Comment: 10-12 glass of wine a week     Drug use: No     Sexual activity: None   Lifestyle     Physical activity:       Days per week:  x2/week, daily activity       Minutes per session: None     Stress: None       Review Of Systems    Constitutional: no fevers, chills, night sweats or unintentional weight change   Eyes: no vision change, diplopia or red eyes   Ears, Nose, Mouth, Throat: no tinnitus or hearing change, no epistaxis or nasal discharge, no oral lesions, throat clear   Cardiovascular: no chest pain, palpitations, or pain with walking, no orthopnea or PND   Respiratory: no dyspnea, cough, shortness of breath or wheezing   GI: no nausea, vomiting, diarrhea or constipation, no abdominal pain   : no change in urine, no dysuria or hematuria   Musculoskeletal: Constant, dull pain in left calf that radiates upto the knee. Normal ROM  Integumentary: no concerning lesions or moles   Neuro: no loss of strength or sensation, no numbness or tingling, no tremor, no dizziness, no headache   Endo: no polyuria or polydipsia, no temperature intolerance   Allergy: no environmental allergies   Psych: no depression or anxiety, no sleep problems      PHYSICAL EXAM:  BP (!) 142/87 (BP Location: Right arm, Patient Position: Sitting, Cuff Size: Adult Regular)   Pulse 56    Resp 17   Wt 92.3 kg (203 lb 6.4 oz)   BMI 28.80 kg/m        Constitutional: no distress, comfortable, pleasant   Eyes: anicteric, normal extra-ocular movements   Ears, Nose and Throat: tympanic membranes clear, nose clear and free of lesions, throat clear, neck supple with full range of motion, no thyromegaly.   Cardiovascular: regular rate and rhythm, normal S1 and S2, no murmurs, rubs or gallops, peripheral pulses full and symmetric   Respiratory: clear to auscultation, no wheezes or crackles, normal breath sounds   Gastrointestinal: positive bowel sounds, nontender, no hepatosplenomegaly, no masses   Musculoskeletal:Full ROM b/l upper and lower extremities. No increased redness, swelling, tightness, warmth in the left calf compared to the right. No increased pain with deep palpation of the left calf/leg. Increased fullness to palpation in left popliteal fossa compared to the right.   Skin: no concerning lesions, no jaundice   Neurological: Mild resting hand tremor bilaterally. Cranial nerves intact, normal strength and sensation, reflexes at patella and biceps normal, normal gait  Psychological: appropriate mood     IMAGING:    EXAMINATION: US LOWER EXTREMITY VENOUS DUPLEX LEFT, 1/21/2020 2:47 PM     FINDINGS:  In the left lower extremity, the common femoral, superficial femoral,  popliteal and posterior tibial veins demonstrate normal  compressibility and blood flow.     One of the paired left peroneal veins is noncompressible with thrombus  present.                                                                      ASSESSMENT  1 Recurrent DVT      PLAN         - Start Apixaban anticoag therapy  - Check prothrombin, Factor V mutation  - Refer to Hematology for further assessment of recurrent DVT's & anticoag duration    Geoffrey Kim, MS4

## 2020-01-21 NOTE — PROGRESS NOTES
HPI  Bernabe Dykes is a 59 year old male presents for   Chief Complaint   Patient presents with      Deep Vein Thrombosis        Here for possible DVT on left lower leg      Patient was last seen in clinic (Dr. Todd) on 01/08/2020 for routine annual physical. Today, patient reports that he has been having constant, dull pain in his left calf for about a week. He first noticed his symptoms sometime last week while waking up in the morning and it has been persistent since then.He does not report  or recall any trauma or associated injury to the leg. He reports the pain as a dull ache that is constant and radiates upto his left knee popliteal fossa. He does report some increased swelling in the left calf, but denies any redness,tightness, or incresed pain with any activity or particular position. He has tried using ibuprofen for the pain but states that it has not been very helpful. He denies any recent fever, chills, illness, SOB, chest pain, joint pain, rashes, lesions, or any other associated symptoms.   Mr. Dykes has a history of two blood clots in the past, one superficial in the lower extremity and one DVT in his R leg. He reports that these episodes were years ago, and he was treated with warfarin for 6-9 months for the DVT. He has not been on any anti-coag since the last episode.He reports that this episode of left calf pain feels similar to his previous episode of DVT and is concerned about recurrence.       Past Medical History:   Diagnosis Date     DVT of lower extremity (deep venous thrombosis) (H)     2005 and 2012     Hyperlipidemia      IGT (impaired glucose tolerance)      Past Surgical History:   Procedure Laterality Date     COLONOSCOPY  2/17/2014    Procedure: COLONOSCOPY;;  Surgeon: Nathan Olmedo MD;  Location:  GI     ORTHOPEDIC SURGERY  1978    left knee repair     ORTHOPEDIC SURGERY      left wrist repair, metal      Family History   Problem Relation Age of Onset     Coronary Artery  Disease Father 54        CABG, ICD     Obesity Father      Diabetes Father      Diabetes Mother      Obesity Mother      Social History     Socioeconomic History     Marital status:      Spouse name: None     Number of children: None     Years of education: None     Highest education level: None   Occupational History     None   Social Needs     Financial resource strain: None     Food insecurity:     Worry: None     Inability: None     Transportation needs:     Medical: None     Non-medical: None   Tobacco Use     Smoking status: Never Smoker     Smokeless tobacco: Never Used   Substance and Sexual Activity     Alcohol use: Yes     Comment: 10-12 glass of wine a week     Drug use: No     Sexual activity: None   Lifestyle     Physical activity:     Days per week:  x2/week, daily activity     Minutes per session: None     Stress: None   Relationships     Social connections:     Talks on phone: None     Gets together: None     Attends Taoist service: None     Active member of club or organization: None     Attends meetings of clubs or organizations: None     Relationship status: None     Intimate partner violence:     Fear of current or ex partner: None     Emotionally abused: None     Physically abused: None     Forced sexual activity: None   Other Topics Concern     Parent/sibling w/ CABG, MI or angioplasty before 65F 55M? Not Asked   Social History Narrative     None     Review Of Systems     Constitutional: no fevers, chills, night sweats or unintentional weight change   Eyes: no vision change, diplopia or red eyes   Ears, Nose, Mouth, Throat: no tinnitus or hearing change, no epistaxis or nasal discharge, no oral lesions, throat clear   Cardiovascular: no chest pain, palpitations, or pain with walking, no orthopnea or PND   Respiratory: no dyspnea, cough, shortness of breath or wheezing   GI: no nausea, vomiting, diarrhea or constipation, no abdominal pain   : no change in urine, no  dysuria or hematuria   Musculoskeletal: Constant, dull pain in left calf that radiates upto the knee. Normal ROM  Integumentary: no concerning lesions or moles   Neuro: no loss of strength or sensation, no numbness or tingling, no tremor, no dizziness, no headache   Endo: no polyuria or polydipsia, no temperature intolerance   Allergy: no environmental allergies   Psych: no depression or anxiety, no sleep problems    Exam:  BP (!) 142/87 (BP Location: Right arm, Patient Position: Sitting, Cuff Size: Adult Regular)   Pulse 56   Resp 17   Wt 92.3 kg (203 lb 6.4 oz)   BMI 28.80 kg/m    203 lbs 6.4 oz    Constitutional: no distress, comfortable, pleasant   Eyes: anicteric, normal extra-ocular movements   Ears, Nose and Throat: tympanic membranes clear, nose clear and free of lesions, throat clear, neck supple with full range of motion, no thyromegaly.   Cardiovascular: regular rate and rhythm, normal S1 and S2, no murmurs, rubs or gallops, peripheral pulses full and symmetric   Respiratory: clear to auscultation, no wheezes or crackles, normal breath sounds   Gastrointestinal: positive bowel sounds, nontender, no hepatosplenomegaly, no masses   Musculoskeletal:Full ROM b/l upper and lower extremities. No increased redness, swelling, tightness, warmth in the left calf compared to the right. No increased pain with deep palpation of the left calf/leg. Increased fullness to palpation in left popliteal fossa compared to the right.   Skin: no concerning lesions, no jaundice   Neurological: Mild resting hand tremor bilaterally. Cranial nerves intact, normal strength and sensation, reflexes at patella and biceps normal, normal gait  Psychological: appropriate mood       IMAGING  EXAMINATION: US LOWER EXTREMITY VENOUS DUPLEX LEFT, 1/21/2020 2:47 PM      FINDINGS:  In the left lower extremity, the common femoral, superficial femoral,  popliteal and posterior tibial veins demonstrate normal  compressibility and blood  flow.     One of the paired left peroneal veins is noncompressible with thrombus  Present.    ASSESSMENT  1 Recurrent DVT    PLAN  - Start Apixaban anticoag therapy  - Check prothrombin, Factor V mutation  - Refer to Hematology for further assessment of recurrent DVT's & anticoag duration    Geoffrey Kim, MS4  Over 25 minutes spent with patient the majority in counseling and coordinating care.    The medical student acted as scribe and the encounter documented above was completely performed by myself.  Supervising Doctor MD Josue Castro MD  General Internal Medicine  [facility]  [phone]

## 2020-01-21 NOTE — NURSING NOTE
Chief Complaint   Patient presents with     Deep Vein Thrombosis     Here for possible DVT on left lower leg       Farooq Huffman CMA (AAMA) at 12:21 PM on 1/21/2020

## 2020-01-21 NOTE — PATIENT INSTRUCTIONS
[facility] Medication Refill Request Information:  * Please contact your pharmacy regarding ANY request for medication refills.  ** [facility] Prescription Fax = [phone]  * Please allow 3 business days for routine medication refills.  * Please allow 5 business days for controlled substance medication refills.     [facility] Test Result notification information:  *You will be notified with in 7-10 days of your appointment day regarding the results of your test.  If you are on MyChart you will be notified as soon as the provider has reviewed the results and signed off on them.    [facility] [phone]

## 2020-02-16 DIAGNOSIS — I82.402 RECURRENT DEEP VEIN THROMBOSIS (DVT) OF LEFT LOWER EXTREMITY (H): ICD-10-CM

## 2020-02-16 DIAGNOSIS — Z86.718 HISTORY OF DEEP VENOUS THROMBOSIS: ICD-10-CM

## 2020-02-19 NOTE — TELEPHONE ENCOUNTER
ELIQUIS 5MG TABLETS    Last Written Prescription Date:  1/21/2020  Last Fill Quantity: 74,   # refills: 0  Last Office Visit : 1/21/2020  Future Office visit:  None    Routing refill request to provider for review/approval because:  Would the Provider like an updated Platelet draw before refilling Pt's med??  Last lab draw 1/28/2019.     Referring to clinic for approval and Lab draws if need per   Providers orders.      Recent Labs   Lab Test 01/28/19  0745          Nerissa Barroso RN  Central Triage Red Flags/Med Refills

## 2020-02-20 NOTE — TELEPHONE ENCOUNTER
Patient has an appointment with hematology on 4/13/20. Length of anticoagulation therapy will be determined at that time.    Will send refill request to PCP.    Yolande Salas RN ([place])

## 2020-04-13 ENCOUNTER — VIRTUAL VISIT (OUTPATIENT)
Dept: HEMATOLOGY | Facility: CLINIC | Age: 60
End: 2020-04-13
Attending: INTERNAL MEDICINE
Payer: COMMERCIAL

## 2020-04-13 DIAGNOSIS — I82.403 RECURRENT DEEP VEIN THROMBOSIS (DVT) OF BOTH LOWER EXTREMITIES (H): Primary | ICD-10-CM

## 2020-04-13 PROCEDURE — 99205 OFFICE O/P NEW HI 60 MIN: CPT | Mod: 95 | Performed by: INTERNAL MEDICINE

## 2020-04-13 NOTE — PROGRESS NOTES
Patient was contacted to complete the pre-visit call prior to their telephone visit with the provider.  The following statement was read:       Because of Coronavirus we are instituting telephone visits when possible to keep everyone safe. The telephone visit will be a call between you and the provider.  This service lets us provide the care you need with a short telephone conversation.  If a prescription is necessary, we can send it directly to your pharmacy.If lab work or other testing is needed, we can help arrange a place/time for that to be done at a later date.If during the course of the call the provider feels a telephone visit is not appropriate, then your insurance company will not be billed.       Allergies and medications were reviewed and travel screening complete.     I thanked them for their time to cover this information.     Jelani Ball CMA

## 2020-04-15 NOTE — PROGRESS NOTES
[facility]  [facility]  [facility] [address], [place] [address]  Main: [phone], Fax: [phone]        Outpatient Clinic Visit  Date:  04/13/2020    NOTE:  Due to the ongoing COVID-19 outbreak, this visit was conducted by telephone, with the patient's approval.    Jeremi Dykes is a 59-year-old male referred for evaluation and treatment recommendations related to his recent diagnosis of deep vein thrombosis in January 2020.    He has a past history of both deep and superficial venous thrombosis.  The first episode occurred in his left calf in 2005 and was a superficial clot for which he was probably not anticoagulated.  In August 2012, he presented with a distal right lower extremity deep vein thrombosis.  He was treated with warfarin for 3 to 4 months, stopping anticoagulation in November 2012.  He was seen at that time by Dr. Joey Boston in the [facility] system.    He did well, without any recurrent events, until presenting to primary care clinic here on January 21, 2020 for an annual physical exam.  At that time, he reported a one-week history of left calf pain which he recognized as feeling very similar to his past episode of DVT.  There was no obvious triggering events for venous thrombosis including no recent trauma or injury to the extremity, no recent surgery, travel, or prolonged immobility.  Ultrasound feel the presence of a clot in 1 of the paired left posterior tibial veins.  He was started on anticoagulation with apixaban and his symptoms resolved within 1 month.  He is tolerating apixaban without any difficulty.    He has no other significant underlying medical problems, and takes no medications other than apixaban and a multivitamin.  Overall he has felt well in the last year, with no new symptoms of concern.    There is a family history of venous thrombosis, the details of which are somewhat sparse.  His sister apparently had  a deep vein thrombosis which he believes was provoked.  Chart notes mention a history of deep vein thrombosis in his maternal grandfather, although he was unsure about that.    Physical exam:  Telephone visit, not done.    Labs:  He had a hypercoagulable work-up in September 2005, presumably after his first episode of superficial thrombophlebitis.  Details of this are not found in the available chart notes, although I suspect this testing was likely performed because of his young age.  He does not have factor V Leiden or the prothrombin gene mutation.  Protein C, protein S, and antithrombin levels were normal.  He did not have a lupus anticoagulant, and anticardiolipin antibody titers were negative.      ASSESSMENT / PLAN:  1.  History of recurrent venous thrombosis.    Superficial thrombophlebitis, left calf, 2005    Unprovoked distal right leg DVT, August 2012, treated with 3 to 4 months of anticoagulation    Unprovoked distal left leg DVT, January 2020  2.  Negative hypercoagulable work-up, September 2005.    Jeremi is an otherwise healthy 59-year-old male who has now had his second episode of unprovoked deep vein thrombosis.  Although both of his deep vein thromboses have been distal, the fact that they have been unprovoked warrants strong consideration for long-term anticoagulation.  He is otherwise healthy and his bleeding risk appears to be low.    He has already had a hypercoagulable work-up and no underlying genetic or acquired thrombophilia was found.  There are no additional tests which need to be done at this time.    We discussed the different anticoagulant options.  He is a good candidate for a direct oral anticoagulant.  We discussed switching from apixaban to rivaroxaban for the convenience of once daily dosing.  He will have an insurance change next month and was interested in looking into his out-of-pocket cost for each of those medications.  I will forward this information to our clinic pharmacist  and ask him to contact Jeremi to sort out the best option for him.    He should have a repeat ultrasound performed 3 to 6 months after initiating anticoagulation.  He should also have at least an annual reevaluation of his long-term anticoagulation plan.  He would prefer to have this done through his primary care clinic visits.  He should have his renal function monitored at least annually, and ideally every 6 months, while on a direct oral anticoagulant.      We have not scheduled return visit for him here.  He has our contact information and was encouraged to call with any new questions or concerns.      Total time 60 minutes, all in counseling and coordination of care.        Deandre Campbell MD  Associate Professor of Medicine  Division of Hematology, Oncology, and Transplantation  Director, [facility]

## 2020-04-16 DIAGNOSIS — I82.403 RECURRENT DEEP VEIN THROMBOSIS (DVT) OF BOTH LOWER EXTREMITIES (H): Primary | ICD-10-CM

## 2021-01-11 ENCOUNTER — OFFICE VISIT (OUTPATIENT)
Dept: INTERNAL MEDICINE | Facility: CLINIC | Age: 61
End: 2021-01-11
Payer: COMMERCIAL

## 2021-01-11 VITALS
SYSTOLIC BLOOD PRESSURE: 152 MMHG | OXYGEN SATURATION: 98 % | BODY MASS INDEX: 30.58 KG/M2 | WEIGHT: 216 LBS | DIASTOLIC BLOOD PRESSURE: 96 MMHG | HEART RATE: 54 BPM

## 2021-01-11 DIAGNOSIS — R17 ELEVATED BILIRUBIN: ICD-10-CM

## 2021-01-11 DIAGNOSIS — E78.5 HYPERLIPIDEMIA, UNSPECIFIED HYPERLIPIDEMIA TYPE: ICD-10-CM

## 2021-01-11 DIAGNOSIS — R73.02 IGT (IMPAIRED GLUCOSE TOLERANCE): ICD-10-CM

## 2021-01-11 DIAGNOSIS — Z20.822 COVID-19 RULED OUT: ICD-10-CM

## 2021-01-11 DIAGNOSIS — I82.402 RECURRENT DEEP VEIN THROMBOSIS (DVT) OF LEFT LOWER EXTREMITY (H): Primary | ICD-10-CM

## 2021-01-11 DIAGNOSIS — I82.402 RECURRENT DEEP VEIN THROMBOSIS (DVT) OF LEFT LOWER EXTREMITY (H): ICD-10-CM

## 2021-01-11 LAB
ALBUMIN SERPL-MCNC: 3.9 G/DL (ref 3.4–5)
ALP SERPL-CCNC: 53 U/L (ref 40–150)
ALT SERPL W P-5'-P-CCNC: 30 U/L (ref 0–70)
ANION GAP SERPL CALCULATED.3IONS-SCNC: 2 MMOL/L (ref 3–14)
AST SERPL W P-5'-P-CCNC: 14 U/L (ref 0–45)
BASOPHILS # BLD AUTO: 0.1 10E9/L (ref 0–0.2)
BASOPHILS NFR BLD AUTO: 1.6 %
BILIRUB SERPL-MCNC: 2 MG/DL (ref 0.2–1.3)
BUN SERPL-MCNC: 13 MG/DL (ref 7–30)
CALCIUM SERPL-MCNC: 9 MG/DL (ref 8.5–10.1)
CHLORIDE SERPL-SCNC: 109 MMOL/L (ref 94–109)
CHOLEST SERPL-MCNC: 204 MG/DL
CO2 SERPL-SCNC: 30 MMOL/L (ref 20–32)
CREAT SERPL-MCNC: 0.96 MG/DL (ref 0.66–1.25)
DIFFERENTIAL METHOD BLD: NORMAL
EOSINOPHIL # BLD AUTO: 0.1 10E9/L (ref 0–0.7)
EOSINOPHIL NFR BLD AUTO: 2.2 %
ERYTHROCYTE [DISTWIDTH] IN BLOOD BY AUTOMATED COUNT: 12.5 % (ref 10–15)
GFR SERPL CREATININE-BSD FRML MDRD: 85 ML/MIN/{1.73_M2}
GLUCOSE SERPL-MCNC: 88 MG/DL (ref 70–99)
HCT VFR BLD AUTO: 48.3 % (ref 40–53)
HDLC SERPL-MCNC: 58 MG/DL
HGB BLD-MCNC: 16.1 G/DL (ref 13.3–17.7)
IMM GRANULOCYTES # BLD: 0 10E9/L (ref 0–0.4)
IMM GRANULOCYTES NFR BLD: 0.3 %
LDLC SERPL CALC-MCNC: 125 MG/DL
LYMPHOCYTES # BLD AUTO: 2.6 10E9/L (ref 0.8–5.3)
LYMPHOCYTES NFR BLD AUTO: 40.6 %
MCH RBC QN AUTO: 29.7 PG (ref 26.5–33)
MCHC RBC AUTO-ENTMCNC: 33.3 G/DL (ref 31.5–36.5)
MCV RBC AUTO: 89 FL (ref 78–100)
MONOCYTES # BLD AUTO: 0.7 10E9/L (ref 0–1.3)
MONOCYTES NFR BLD AUTO: 10.8 %
NEUTROPHILS # BLD AUTO: 2.8 10E9/L (ref 1.6–8.3)
NEUTROPHILS NFR BLD AUTO: 44.5 %
NONHDLC SERPL-MCNC: 146 MG/DL
NRBC # BLD AUTO: 0 10*3/UL
NRBC BLD AUTO-RTO: 0 /100
PLATELET # BLD AUTO: 314 10E9/L (ref 150–450)
POTASSIUM SERPL-SCNC: 4.3 MMOL/L (ref 3.4–5.3)
PROT SERPL-MCNC: 7.4 G/DL (ref 6.8–8.8)
RBC # BLD AUTO: 5.43 10E12/L (ref 4.4–5.9)
SODIUM SERPL-SCNC: 140 MMOL/L (ref 133–144)
TRIGL SERPL-MCNC: 103 MG/DL
TSH SERPL DL<=0.005 MIU/L-ACNC: 2.03 MU/L (ref 0.4–4)
WBC # BLD AUTO: 6.4 10E9/L (ref 4–11)

## 2021-01-11 PROCEDURE — 36415 COLL VENOUS BLD VENIPUNCTURE: CPT | Performed by: PATHOLOGY

## 2021-01-11 PROCEDURE — 99396 PREV VISIT EST AGE 40-64: CPT | Performed by: INTERNAL MEDICINE

## 2021-01-11 PROCEDURE — 80050 GENERAL HEALTH PANEL: CPT | Performed by: PATHOLOGY

## 2021-01-11 PROCEDURE — 86769 SARS-COV-2 COVID-19 ANTIBODY: CPT | Performed by: PATHOLOGY

## 2021-01-11 PROCEDURE — 80061 LIPID PANEL: CPT | Performed by: PATHOLOGY

## 2021-01-11 ASSESSMENT — PAIN SCALES - GENERAL: PAINLEVEL: NO PAIN (0)

## 2021-01-11 NOTE — PATIENT INSTRUCTIONS
[facility] Medication Refill Request Information:  * Please contact your pharmacy regarding ANY request for medication refills.  ** [facility] Prescription Fax = [phone]  * Please allow 3 business days for routine medication refills.  * Please allow 5 business days for controlled substance medication refills.     [facility] Test Result notification information:  *You will be notified with in 7-10 days of your appointment day regarding the results of your test.  If you are on MyChart you will be notified as soon as the provider has reviewed the results and signed off on them.    [facility]: [phone]

## 2021-01-11 NOTE — PROGRESS NOTES
HPI  60-year-old presents today for physical examination.  He has been doing quite well.  He is exercising periodically although his weight is up.  He attributes this to a poor diet which is been higher in red meat and less in fruits and vegetables.  He has not been monitoring his blood pressure at all at home and we discussed getting home blood pressure monitor as well as nonpharmacologic blood pressure control measures.  Otherwise he is doing well and has no other symptoms or concerns today.  In February he was at a conference in [place] and after return had severe febrile illness per 1 week from which he recovered.  Past Medical History:   Diagnosis Date     DVT of lower extremity (deep venous thrombosis) (H)     2005 and 2012     Hyperlipidemia      IGT (impaired glucose tolerance)      Past Surgical History:   Procedure Laterality Date     COLONOSCOPY  2/17/2014    Procedure: COLONOSCOPY;;  Surgeon: Nathan Olmedo MD;  Location:  GI     ORTHOPEDIC SURGERY  1978    left knee repair     ORTHOPEDIC SURGERY      left wrist repair, metal      Family History   Problem Relation Age of Onset     Coronary Artery Disease Father 54        CABG, ICD     Obesity Father      Diabetes Father      Diabetes Mother      Obesity Mother      Social History     Socioeconomic History     Marital status: Single     Spouse name: None     Number of children: None     Years of education: None     Highest education level: None   Occupational History     None   Social Needs     Financial resource strain: None     Food insecurity     Worry: None     Inability: None     Transportation needs     Medical: None     Non-medical: None   Tobacco Use     Smoking status: Never Smoker     Smokeless tobacco: Never Used   Substance and Sexual Activity     Alcohol use: Yes     Comment: 10 glass of wine a week     Drug use: No     Sexual activity: None   Lifestyle     Physical activity     Days per week: None     Minutes per session: None      Stress: None   Relationships     Social connections     Talks on phone: None     Gets together: None     Attends [employer] service: None     Active member of club or organization: None     Attends meetings of clubs or organizations: None     Relationship status: None     Intimate partner violence     Fear of current or ex partner: None     Emotionally abused: None     Physically abused: None     Forced sexual activity: None   Other Topics Concern     Parent/sibling w/ CABG, MI or angioplasty before 65F 55M? Not Asked   Social History Narrative     None     Answers for HPI/ROS submitted by the patient on 1/6/2021   General Symptoms: No  Skin Symptoms: No  HENT Symptoms: No  EYE SYMPTOMS: No  HEART SYMPTOMS: No  LUNG SYMPTOMS: No  INTESTINAL SYMPTOMS: No  URINARY SYMPTOMS: No  REPRODUCTIVE SYMPTOMS: No  SKELETAL SYMPTOMS: No  BLOOD SYMPTOMS: No  NERVOUS SYSTEM SYMPTOMS: No  MENTAL HEALTH SYMPTOMS: No    Exam:  BP (!) 152/96 (BP Location: Right arm, Patient Position: Sitting, Cuff Size: Adult Large)   Pulse 54   Wt 98 kg (216 lb)   SpO2 98%   BMI 30.58 kg/m    216 lbs 0 oz  Physical Exam   The patient is alert, oriented with a clear sensorium.   Skin shows no lesions or rashes and good turgor.   Head is normocephalic and atraumatic.   Eyes show PERRLA   Ears show normal TMs bilaterally.   Neck shows no nodes, thyromegaly or bruits.   Back is non tender.  Lungs are clear to percussion and auscultation.   Heart shows normal S1 and S2 without murmur or gallop.   Abdomen is soft and nontender without masses or organomegaly.   Genitalia show normal testes. No evidence of inguinal hernia.  Extremities show no edema and no evidence of active synovitis.   Neurologic examination shows cranial nerves II-XII intact. Motor shows 5/5 strength. Reflexes are symmetric. Cerebellar testing shows normal tandem gait.  Romberg negative.    The 10-year ASCVD risk score (Adin RANGEL Jr., et al., 2013) is: 10.5%    Values used to calculate  the score:      Age: 60 years      Sex: Male      Is Non- : No      Diabetic: No      Tobacco smoker: No      Systolic Blood Pressure: 152 mmHg      Is BP treated: No      HDL Cholesterol: 58 mg/dL      Total Cholesterol: 204 mg/dL    ASSESSMENT  1 Hypertension will treat non-pharmacologically  2 hyperlipidemia needs statin  3 history elevated bilirubin likely Gilbert's syndrome  4 recurrent DVT  5 impaired glucose tolerance needs reassessmentfamily history of diabetes  6 possible Covid infection earlier this year    Plan  We discussed nonpharmacologic blood pressure control measures and monitoring his home blood pressure.  We will reassess his labs today.  We will check him for Covid antibodies because of the illness in February.  Offered him atorvastatin 10 mg daily in the result note.    This note was completed using Dragon voice recognition software.      Josue Todd MD  General Internal Medicine  Primary Care Center  [phone]

## 2021-01-11 NOTE — NURSING NOTE
Chief Complaint   Patient presents with     Physical     pt. here for physical        BULMARO Rosenbaum at 2:07 PM on 1/11/2021.

## 2021-01-12 LAB
SARS-COV-2 AB PNL SERPL IA: NEGATIVE
SARS-COV-2 IGG SERPL IA-ACNC: NORMAL

## 2021-01-20 LAB — COPATH REPORT: NORMAL

## 2021-05-19 DIAGNOSIS — I82.403 RECURRENT DEEP VEIN THROMBOSIS (DVT) OF BOTH LOWER EXTREMITIES (H): ICD-10-CM

## 2021-05-20 RX ORDER — RIVAROXABAN 20 MG/1
TABLET, FILM COATED ORAL
Qty: 90 TABLET | Refills: 3 | OUTPATIENT
Start: 2021-05-20

## 2021-05-20 NOTE — TELEPHONE ENCOUNTER
Per Dr. Campbell's dictation, patient preferred to get ongoing anticoagulation through his primary care.

## 2021-06-02 DIAGNOSIS — I82.403 RECURRENT DEEP VEIN THROMBOSIS (DVT) OF BOTH LOWER EXTREMITIES (H): ICD-10-CM

## 2021-06-02 NOTE — TELEPHONE ENCOUNTER
Hi Dr. Todd,    Patient is requesting a 90 day prescription.     Thanks,    RONALD Walls, [facility]  Pharmacy Coordinator  [phone]  [email or web address]

## 2021-10-24 ENCOUNTER — HEALTH MAINTENANCE LETTER (OUTPATIENT)
Age: 61
End: 2021-10-24

## 2022-01-17 ENCOUNTER — LAB (OUTPATIENT)
Dept: LAB | Facility: CLINIC | Age: 62
End: 2022-01-17
Payer: COMMERCIAL

## 2022-01-17 ENCOUNTER — OFFICE VISIT (OUTPATIENT)
Dept: INTERNAL MEDICINE | Facility: CLINIC | Age: 62
End: 2022-01-17
Payer: COMMERCIAL

## 2022-01-17 VITALS
OXYGEN SATURATION: 97 % | BODY MASS INDEX: 31.75 KG/M2 | HEART RATE: 65 BPM | WEIGHT: 221.8 LBS | RESPIRATION RATE: 16 BRPM | DIASTOLIC BLOOD PRESSURE: 96 MMHG | SYSTOLIC BLOOD PRESSURE: 160 MMHG | HEIGHT: 70 IN

## 2022-01-17 DIAGNOSIS — R42 DIZZINESS: ICD-10-CM

## 2022-01-17 DIAGNOSIS — R73.02 IGT (IMPAIRED GLUCOSE TOLERANCE): ICD-10-CM

## 2022-01-17 DIAGNOSIS — E78.49 OTHER HYPERLIPIDEMIA: ICD-10-CM

## 2022-01-17 DIAGNOSIS — R42 DIZZINESS: Primary | ICD-10-CM

## 2022-01-17 DIAGNOSIS — R03.0 WHITE COAT SYNDROME WITHOUT DIAGNOSIS OF HYPERTENSION: ICD-10-CM

## 2022-01-17 LAB
ALBUMIN SERPL-MCNC: 3.8 G/DL (ref 3.4–5)
ALP SERPL-CCNC: 53 U/L (ref 40–150)
ALT SERPL W P-5'-P-CCNC: 27 U/L (ref 0–70)
ANION GAP SERPL CALCULATED.3IONS-SCNC: 7 MMOL/L (ref 3–14)
AST SERPL W P-5'-P-CCNC: 18 U/L (ref 0–45)
BASOPHILS # BLD AUTO: 0.1 10E3/UL (ref 0–0.2)
BASOPHILS NFR BLD AUTO: 1 %
BILIRUB SERPL-MCNC: 1 MG/DL (ref 0.2–1.3)
BUN SERPL-MCNC: 13 MG/DL (ref 7–30)
CALCIUM SERPL-MCNC: 8.6 MG/DL (ref 8.5–10.1)
CHLORIDE BLD-SCNC: 109 MMOL/L (ref 94–109)
CHOLEST SERPL-MCNC: 198 MG/DL
CO2 SERPL-SCNC: 26 MMOL/L (ref 20–32)
CREAT SERPL-MCNC: 1.09 MG/DL (ref 0.66–1.25)
CRP SERPL-MCNC: <2.9 MG/L (ref 0–8)
EOSINOPHIL # BLD AUTO: 0.2 10E3/UL (ref 0–0.7)
EOSINOPHIL NFR BLD AUTO: 3 %
ERYTHROCYTE [DISTWIDTH] IN BLOOD BY AUTOMATED COUNT: 12.4 % (ref 10–15)
ERYTHROCYTE [SEDIMENTATION RATE] IN BLOOD BY WESTERGREN METHOD: 5 MM/HR (ref 0–20)
FASTING STATUS PATIENT QL REPORTED: ABNORMAL
GFR SERPL CREATININE-BSD FRML MDRD: 77 ML/MIN/1.73M2
GLUCOSE BLD-MCNC: 87 MG/DL (ref 70–99)
HBA1C MFR BLD: 5.2 % (ref 0–5.6)
HCT VFR BLD AUTO: 46.2 % (ref 40–53)
HDLC SERPL-MCNC: 47 MG/DL
HGB BLD-MCNC: 15.9 G/DL (ref 13.3–17.7)
IMM GRANULOCYTES # BLD: 0 10E3/UL
IMM GRANULOCYTES NFR BLD: 0 %
LDLC SERPL CALC-MCNC: 136 MG/DL
LYMPHOCYTES # BLD AUTO: 2.4 10E3/UL (ref 0.8–5.3)
LYMPHOCYTES NFR BLD AUTO: 41 %
MCH RBC QN AUTO: 30.1 PG (ref 26.5–33)
MCHC RBC AUTO-ENTMCNC: 34.4 G/DL (ref 31.5–36.5)
MCV RBC AUTO: 88 FL (ref 78–100)
MONOCYTES # BLD AUTO: 0.6 10E3/UL (ref 0–1.3)
MONOCYTES NFR BLD AUTO: 11 %
NEUTROPHILS # BLD AUTO: 2.6 10E3/UL (ref 1.6–8.3)
NEUTROPHILS NFR BLD AUTO: 44 %
NONHDLC SERPL-MCNC: 151 MG/DL
NRBC # BLD AUTO: 0 10E3/UL
NRBC BLD AUTO-RTO: 0 /100
PLATELET # BLD AUTO: 301 10E3/UL (ref 150–450)
POTASSIUM BLD-SCNC: 4.2 MMOL/L (ref 3.4–5.3)
PROT SERPL-MCNC: 7 G/DL (ref 6.8–8.8)
RBC # BLD AUTO: 5.28 10E6/UL (ref 4.4–5.9)
SODIUM SERPL-SCNC: 142 MMOL/L (ref 133–144)
TRIGL SERPL-MCNC: 77 MG/DL
TSH SERPL DL<=0.005 MIU/L-ACNC: 2.23 MU/L (ref 0.4–4)
WBC # BLD AUTO: 6 10E3/UL (ref 4–11)

## 2022-01-17 PROCEDURE — 93000 ELECTROCARDIOGRAM COMPLETE: CPT | Performed by: INTERNAL MEDICINE

## 2022-01-17 PROCEDURE — 85652 RBC SED RATE AUTOMATED: CPT | Performed by: PATHOLOGY

## 2022-01-17 PROCEDURE — 83036 HEMOGLOBIN GLYCOSYLATED A1C: CPT | Performed by: PATHOLOGY

## 2022-01-17 PROCEDURE — 80050 GENERAL HEALTH PANEL: CPT | Performed by: PATHOLOGY

## 2022-01-17 PROCEDURE — 36415 COLL VENOUS BLD VENIPUNCTURE: CPT | Performed by: PATHOLOGY

## 2022-01-17 PROCEDURE — 80061 LIPID PANEL: CPT | Performed by: PATHOLOGY

## 2022-01-17 PROCEDURE — 86140 C-REACTIVE PROTEIN: CPT | Performed by: PATHOLOGY

## 2022-01-17 PROCEDURE — 99396 PREV VISIT EST AGE 40-64: CPT | Mod: 25 | Performed by: INTERNAL MEDICINE

## 2022-01-17 ASSESSMENT — MIFFLIN-ST. JEOR: SCORE: 1817.33

## 2022-01-17 NOTE — NURSING NOTE
"Bernabe Dykes is a 61 year old male patient that presents today in clinic for the following:    Chief Complaint   Patient presents with     Physical     Annual Visit     The patient's allergies and medications were reviewed as noted. A set of vitals were recorded as noted without incident: BP (!) 160/96 (BP Location: Right arm, Patient Position: Sitting, Cuff Size: Adult Regular)   Pulse 65   Resp 16   Ht 1.778 m (5' 10\")   Wt 100.6 kg (221 lb 12.8 oz)   SpO2 97%   BMI 31.82 kg/m  . The patient was asked if they had any of the following symptoms in the last forty-eight hours: (1) fever or chills, (2) cough, (3) shortness of breath or difficulty breathing, (4) fatigue, (5) muscle or body aches, (6) headache, (7) new loss of taste or smell, (8) sore throat, (9) congestion or runny nose, (10) nausea or vomiting, and (11) diarrhea. Bernabe Dykes denies having any of the following symptoms in the last forty-eight hours: (1) fever or chills, (2) cough, (3) shortness of breath or difficulty breathing, (4) fatigue, (5) muscle or body aches, (6) headache, (7) new loss of taste or smell, (8) sore throat, (9) congestion or runny nose, (10) nausea or vomiting, and (11) diarrhea. The patient does not have any other questions for the provider.    Jorge Toussaint, EMT at 9:02 AM on 1/17/2022  "

## 2022-01-17 NOTE — PATIENT INSTRUCTIONS
To schedule an appointment for your twenty-four hour blood pressure monitoring, please call [phone]. To get fitted for leadless EKG, please call [phone].

## 2022-01-17 NOTE — PROGRESS NOTES
HPI  61-year-old presents today for physical examination.  He had 2 episodes 1 in October 1 in November of sudden onset like a light switch of dizziness.  Dizziness is described as a sensation of movement but not spinning sensation of imbalance but not impending faint or syncope.  He reports that they came on both times while he was seated.  Once while he was driving the car he had to pull off to the side of the road and it resolved after about 10 minutes.  The other was just sitting on his own home talking to the neighbors and that lasted about 15 minutes again resolved spontaneously.  Both times the resolution was prompt an immediate just like the onset.  He is aware of no palpitations no chest pain no dyspnea or other symptoms.  He had no ringing in the ears no trouble hearing or vestibular symptoms.  There is no new or different medications or ingestions.  Otherwise he has been feeling well he did lay off the exercise room.  At time of his recent resume this.  His diet is fair.  He has not been monitoring his blood pressure at home.  He is taking the rivaroxaban and tolerating that well.  Past Medical History:   Diagnosis Date     DVT of lower extremity (deep venous thrombosis) (H)     2005 and 2012     Hyperlipidemia      IGT (impaired glucose tolerance)      Past Surgical History:   Procedure Laterality Date     COLONOSCOPY  2/17/2014    Procedure: COLONOSCOPY;;  Surgeon: Nathan Olmedo MD;  Location:  GI     ORTHOPEDIC SURGERY  1978    left knee repair     ORTHOPEDIC SURGERY      left wrist repair, metal      Family History   Problem Relation Age of Onset     Coronary Artery Disease Father 54        CABG, ICD     Obesity Father      Diabetes Father      Diabetes Mother      Obesity Mother      Answers for HPI/ROS submitted by the patient on 1/13/2022  General Symptoms: No  Skin Symptoms: No  HENT Symptoms: No  EYE SYMPTOMS: No  HEART SYMPTOMS: No  LUNG SYMPTOMS: No  INTESTINAL SYMPTOMS: No  URINARY SYMPTOMS:  "No  REPRODUCTIVE SYMPTOMS: No  SKELETAL SYMPTOMS: No  BLOOD SYMPTOMS: No  NERVOUS SYSTEM SYMPTOMS: No  MENTAL HEALTH SYMPTOMS: No        Exam:  BP (!) 160/96 (BP Location: Right arm, Patient Position: Sitting, Cuff Size: Adult Regular)   Pulse 65   Resp 16   Ht 1.778 m (5' 10\")   Wt 100.6 kg (221 lb 12.8 oz)   SpO2 97%   BMI 31.82 kg/m    221 lbs 12.8 oz  Physical Exam   The patient is alert, oriented with a clear sensorium.   Skin shows no lesions or rashes and good turgor.   Head is normocephalic and atraumatic.   Eyes show PERRLA with benign optic fundi.   Ears show normal TMs bilaterally.   Mouth shows clear oral mucosa.   Neck shows no nodes, thyromegaly or bruits.   Back is non tender.  Lungs are clear to percussion and auscultation.   Heart shows normal S1 and S2 without murmur or gallop.   Abdomen is soft and nontender without masses or organomegaly.   Genitalia show normal testes. No evidence of inguinal hernia.  Rectal shows small smooth prostate without nodules or masses.  Extremities show no edema and no evidence of active synovitis.   Neurologic examination shows cranial nerves II-XII intact. Motor shows 5/5 strength. Reflexes are symmetric. Cerebellar testing shows normal tandem gait.  Romberg negative.  [place] is negative bilaterally  EKG was reviewed with him with entirely normal  Labs reviewed:  Results for orders placed or performed in visit on 01/17/22   Comprehensive metabolic panel     Status: Normal   Result Value Ref Range    Sodium 142 133 - 144 mmol/L    Potassium 4.2 3.4 - 5.3 mmol/L    Chloride 109 94 - 109 mmol/L    Carbon Dioxide (CO2) 26 20 - 32 mmol/L    Anion Gap 7 3 - 14 mmol/L    Urea Nitrogen 13 7 - 30 mg/dL    Creatinine 1.09 0.66 - 1.25 mg/dL    Calcium 8.6 8.5 - 10.1 mg/dL    Glucose 87 70 - 99 mg/dL    Alkaline Phosphatase 53 40 - 150 U/L    AST 18 0 - 45 U/L    ALT 27 0 - 70 U/L    Protein Total 7.0 6.8 - 8.8 g/dL    Albumin 3.8 3.4 - 5.0 g/dL    Bilirubin Total " 1.0 0.2 - 1.3 mg/dL    GFR Estimate 77 >60 mL/min/1.73m2   TSH with free T4 reflex     Status: Normal   Result Value Ref Range    TSH 2.23 0.40 - 4.00 mU/L   CRP inflammation     Status: Normal   Result Value Ref Range    CRP Inflammation <2.9 0.0 - 8.0 mg/L   Erythrocyte sedimentation rate auto     Status: Normal   Result Value Ref Range    Erythrocyte Sedimentation Rate 5 0 - 20 mm/hr   Lipid Profile     Status: Abnormal   Result Value Ref Range    Cholesterol 198 <200 mg/dL    Triglycerides 77 <150 mg/dL    Direct Measure HDL 47 >=40 mg/dL    LDL Cholesterol Calculated 136 (H) <=100 mg/dL    Non HDL Cholesterol 151 (H) <130 mg/dL    Patient Fasting > 8hrs? Unknown     Narrative    Cholesterol  Desirable:  <200 mg/dL    Triglycerides  Normal:  Less than 150 mg/dL  Borderline High:  150-199 mg/dL  High:  200-499 mg/dL  Very High:  Greater than or equal to 500 mg/dL    Direct Measure HDL  Female:  Greater than or equal to 50 mg/dL   Male:  Greater than or equal to 40 mg/dL    LDL Cholesterol  Desirable:  <100mg/dL  Above Desirable:  100-129 mg/dL   Borderline High:  130-159 mg/dL   High:  160-189 mg/dL   Very High:  >= 190 mg/dL    Non HDL Cholesterol  Desirable:  130 mg/dL  Above Desirable:  130-159 mg/dL  Borderline High:  160-189 mg/dL  High:  190-219 mg/dL  Very High:  Greater than or equal to 220 mg/dL   Hemoglobin A1c     Status: Normal   Result Value Ref Range    Hemoglobin A1C 5.2 0.0 - 5.6 %   CBC with platelets and differential     Status: None   Result Value Ref Range    WBC Count 6.0 4.0 - 11.0 10e3/uL    RBC Count 5.28 4.40 - 5.90 10e6/uL    Hemoglobin 15.9 13.3 - 17.7 g/dL    Hematocrit 46.2 40.0 - 53.0 %    MCV 88 78 - 100 fL    MCH 30.1 26.5 - 33.0 pg    MCHC 34.4 31.5 - 36.5 g/dL    RDW 12.4 10.0 - 15.0 %    Platelet Count 301 150 - 450 10e3/uL    % Neutrophils 44 %    % Lymphocytes 41 %    % Monocytes 11 %    % Eosinophils 3 %    % Basophils 1 %    % Immature Granulocytes 0 %    NRBCs per 100 WBC 0  <1 /100    Absolute Neutrophils 2.6 1.6 - 8.3 10e3/uL    Absolute Lymphocytes 2.4 0.8 - 5.3 10e3/uL    Absolute Monocytes 0.6 0.0 - 1.3 10e3/uL    Absolute Eosinophils 0.2 0.0 - 0.7 10e3/uL    Absolute Basophils 0.1 0.0 - 0.2 10e3/uL    Absolute Immature Granulocytes 0.0 <=0.4 10e3/uL    Absolute NRBCs 0.0 10e3/uL   CBC with Platelets & Differential     Status: None    Narrative    The following orders were created for panel order CBC with Platelets & Differential.  Procedure                               Abnormality         Status                     ---------                               -----------         ------                     CBC with platelets and d...[[id number]]                      Final result                 Please view results for these tests on the individual orders.   Results for orders placed or performed in visit on 01/17/22   EKG Performed in Clinic w/ Provider Reading Fee     Status: None (Preliminary result)   Result Value Ref Range    Systolic Blood Pressure  mmHg    Diastolic Blood Pressure  mmHg    Ventricular Rate 60 BPM    Atrial Rate 60 BPM    KS Interval 154 ms    QRS Duration 96 ms     ms    QTc 416 ms    P Axis 47 degrees    R AXIS 1 degrees    T Axis 30 degrees    Interpretation ECG       Sinus rhythm with sinus arrhythmia  Normal ECG  When compared with ECG of 09-MAR-2001 11:37,  No significant change was found       The 10-year ASCVD risk score (Adindenys MULTANI Jr., et al., 2013) is: 14%    Values used to calculate the score:      Age: 61 years      Sex: Male      Is Non- : No      Diabetic: No      Tobacco smoker: No      Systolic Blood Pressure: 160 mmHg      Is BP treated: No      HDL Cholesterol: 47 mg/dL      Total Cholesterol: 198 mg/dL    ASSESSMENT  1  sudden paroxysmal dizziness uncertain etiology rule out arrhythmia  2 Hypertension ?White coat  3 hyperlipidemia   4 recurrent DVT  5 impaired glucose tolerance   6 history elevated bilirubin likely  Gilbert's syndrome     Plan  We will get a 7 day Holter and a 24-hour blood pressure monitor.  We will reassess his labs today and along with his EKG which was reviewed with him and was normal.  Having follow-up should he have any recurrent symptoms    This note was completed using Dragon voice recognition software.      Josue Todd MD  General Internal Medicine  Primary Care Center  [phone]

## 2022-01-18 LAB
ATRIAL RATE - MUSE: 60 BPM
DIASTOLIC BLOOD PRESSURE - MUSE: NORMAL MMHG
INTERPRETATION ECG - MUSE: NORMAL
P AXIS - MUSE: 47 DEGREES
PR INTERVAL - MUSE: 154 MS
QRS DURATION - MUSE: 96 MS
QT - MUSE: 416 MS
QTC - MUSE: 416 MS
R AXIS - MUSE: 1 DEGREES
SYSTOLIC BLOOD PRESSURE - MUSE: NORMAL MMHG
T AXIS - MUSE: 30 DEGREES
VENTRICULAR RATE- MUSE: 60 BPM

## 2022-01-19 ENCOUNTER — HOSPITAL ENCOUNTER (OUTPATIENT)
Dept: CARDIOLOGY | Facility: CLINIC | Age: 62
End: 2022-01-19
Attending: INTERNAL MEDICINE
Payer: COMMERCIAL

## 2022-01-19 DIAGNOSIS — R03.0 WHITE COAT SYNDROME WITHOUT DIAGNOSIS OF HYPERTENSION: ICD-10-CM

## 2022-01-19 DIAGNOSIS — R42 DIZZINESS: ICD-10-CM

## 2022-01-19 PROCEDURE — 93242 EXT ECG>48HR<7D RECORDING: CPT

## 2022-01-19 PROCEDURE — 93790 AMBL BP MNTR W/SW I&R: CPT | Performed by: INTERNAL MEDICINE

## 2022-01-19 PROCEDURE — 93244 EXT ECG>48HR<7D REV&INTERPJ: CPT | Performed by: INTERNAL MEDICINE

## 2022-01-19 PROCEDURE — 93786 AMBL BP MNTR W/SW REC ONLY: CPT

## 2022-01-19 PROCEDURE — 93788 AMBL BP MNTR W/SW A/R: CPT

## 2022-01-19 NOTE — PROGRESS NOTES
Per Dr. Todd, patient to have 7 day Ziopatch monitor placed.  Diagnosis: R42 - Dizziness  Monitor placed: Yes  Patient Instructed: Yes  Patient verbalized understanding: Yes  Holter # [id number]    Monitor placed by Humberto eFrnandez CMA  10:11 AM

## 2022-06-22 DIAGNOSIS — I82.403 RECURRENT DEEP VEIN THROMBOSIS (DVT) OF BOTH LOWER EXTREMITIES (H): ICD-10-CM

## 2022-06-27 NOTE — TELEPHONE ENCOUNTER
Last Clinic Visit: 1/17/2022  [facility] [place]  Creatinine Clearance of: 100.267 calculated using:    Serum Creatinine  of: 1.09 from: 1/17/22  Weight of: 100.6 kg from: 1/17/22

## 2022-10-16 ENCOUNTER — HEALTH MAINTENANCE LETTER (OUTPATIENT)
Age: 62
End: 2022-10-16

## 2022-10-17 ENCOUNTER — MYC MEDICAL ADVICE (OUTPATIENT)
Dept: INTERNAL MEDICINE | Facility: CLINIC | Age: 62
End: 2022-10-17

## 2023-01-06 DIAGNOSIS — I82.403 RECURRENT DEEP VEIN THROMBOSIS (DVT) OF BOTH LOWER EXTREMITIES (H): ICD-10-CM

## 2023-01-09 NOTE — TELEPHONE ENCOUNTER
XARELTO 20MG TABS      Last Written Prescription Date:  6/27/22  Last Fill Quantity: 90,   # refills: 1  Last Office Visit : 1/17/22  Future Office visit:  1/19/23    Routing refill request to provider for review/approval because:  Overdue for creatinine and creatinine clearance

## 2023-01-19 ENCOUNTER — LAB (OUTPATIENT)
Dept: LAB | Facility: CLINIC | Age: 63
End: 2023-01-19
Payer: COMMERCIAL

## 2023-01-19 ENCOUNTER — OFFICE VISIT (OUTPATIENT)
Dept: INTERNAL MEDICINE | Facility: CLINIC | Age: 63
End: 2023-01-19
Payer: COMMERCIAL

## 2023-01-19 VITALS
HEART RATE: 70 BPM | HEIGHT: 70 IN | SYSTOLIC BLOOD PRESSURE: 150 MMHG | DIASTOLIC BLOOD PRESSURE: 87 MMHG | BODY MASS INDEX: 29.76 KG/M2 | WEIGHT: 207.9 LBS | OXYGEN SATURATION: 97 %

## 2023-01-19 DIAGNOSIS — Z13.6 CARDIOVASCULAR SCREENING; LDL GOAL LESS THAN 130: Primary | ICD-10-CM

## 2023-01-19 DIAGNOSIS — Z12.5 SCREENING FOR PROSTATE CANCER: ICD-10-CM

## 2023-01-19 DIAGNOSIS — I10 PRIMARY HYPERTENSION: ICD-10-CM

## 2023-01-19 DIAGNOSIS — Z13.6 CARDIOVASCULAR SCREENING; LDL GOAL LESS THAN 130: ICD-10-CM

## 2023-01-19 LAB
ALBUMIN SERPL BCG-MCNC: 4.4 G/DL (ref 3.5–5.2)
ALP SERPL-CCNC: 51 U/L (ref 40–129)
ALT SERPL W P-5'-P-CCNC: 24 U/L (ref 10–50)
ANION GAP SERPL CALCULATED.3IONS-SCNC: 7 MMOL/L (ref 7–15)
AST SERPL W P-5'-P-CCNC: 25 U/L (ref 10–50)
BILIRUB SERPL-MCNC: 1 MG/DL
BUN SERPL-MCNC: 12.6 MG/DL (ref 8–23)
CALCIUM SERPL-MCNC: 9.5 MG/DL (ref 8.8–10.2)
CHLORIDE SERPL-SCNC: 108 MMOL/L (ref 98–107)
CHOLEST SERPL-MCNC: 200 MG/DL
CREAT SERPL-MCNC: 1.08 MG/DL (ref 0.67–1.17)
DEPRECATED HCO3 PLAS-SCNC: 29 MMOL/L (ref 22–29)
GFR SERPL CREATININE-BSD FRML MDRD: 78 ML/MIN/1.73M2
GLUCOSE SERPL-MCNC: 95 MG/DL (ref 70–99)
HDLC SERPL-MCNC: 47 MG/DL
LDLC SERPL CALC-MCNC: 140 MG/DL
NONHDLC SERPL-MCNC: 153 MG/DL
POTASSIUM SERPL-SCNC: 4.6 MMOL/L (ref 3.4–5.3)
PROT SERPL-MCNC: 7.1 G/DL (ref 6.4–8.3)
PSA SERPL-MCNC: 1.94 NG/ML (ref 0–4.5)
SODIUM SERPL-SCNC: 144 MMOL/L (ref 136–145)
TRIGL SERPL-MCNC: 64 MG/DL

## 2023-01-19 PROCEDURE — 80061 LIPID PANEL: CPT | Performed by: PATHOLOGY

## 2023-01-19 PROCEDURE — G0103 PSA SCREENING: HCPCS | Performed by: PATHOLOGY

## 2023-01-19 PROCEDURE — 36415 COLL VENOUS BLD VENIPUNCTURE: CPT | Performed by: PATHOLOGY

## 2023-01-19 PROCEDURE — 99396 PREV VISIT EST AGE 40-64: CPT | Performed by: HOSPITALIST

## 2023-01-19 PROCEDURE — 80053 COMPREHEN METABOLIC PANEL: CPT | Performed by: PATHOLOGY

## 2023-01-19 RX ORDER — AMLODIPINE BESYLATE 2.5 MG/1
2.5 TABLET ORAL DAILY
Qty: 90 TABLET | Refills: 1 | Status: SHIPPED | OUTPATIENT
Start: 2023-01-19 | End: 2023-03-22

## 2023-01-19 NOTE — PATIENT INSTRUCTIONS
- Recommend labs Lipid, CMP, PSA labs today.   - Recommend Tetanus vaccine at Pharmacy by December of this year.   - Colonoscopy again in 2024.   - Start Amlodipine 2.5mg daily. Monitor for lightheadedness and leg swelling.     Follow up again in 2-3 months to recheck on blood pressure.

## 2023-01-19 NOTE — PROGRESS NOTES
"Assessment & Plan     ICD-10-CM    1. CARDIOVASCULAR SCREENING; LDL GOAL LESS THAN 130  Z13.6 Comprehensive metabolic panel (BMP + Alb, Alk Phos, ALT, AST, Total. Bili, TP)     Lipid panel reflex to direct LDL Fasting      2. Screening for prostate cancer  Z12.5 PSA, screen      3. Primary hypertension  I10 amLODIPine (NORVASC) 2.5 MG tablet          Follow Up/Next Steps    Return in about 3 months (around 4/19/2023).  - Recommend labs Lipid, CMP, PSA labs today.   - Recommend Tetanus vaccine at Pharmacy by December of this year.   - Colonoscopy again in 2024.   - Start Amlodipine 2.5mg daily. Monitor for lightheadedness and leg swelling.      Counseling and Education  Reviewed preventive health counseling, as reflected in patient instructions      BMI:   Estimated body mass index is 29.83 kg/m  as calculated from the following:    Height as of this encounter: 1.778 m (5' 10\").    Weight as of this encounter: 94.3 kg (207 lb 14.4 oz).   Weight management plan: Discussed healthy diet and exercise guidelines      Appropriate preventive services were discussed with this patient, including applicable screening as appropriate for cardiovascular disease, diabetes, osteopenia/osteoporosis, and glaucoma.  As appropriate for age/gender, discussed screening for colorectal cancer, prostate cancer, breast cancer, and cervical cancer. Checklist reviewing preventive services available has been given to the patient.    Reviewed patients plan of care and provided an AVS. The Basic Care Plan (routine screening as documented in Health Maintenance) for Bernabe meets the Care Plan requirement. This Care Plan has been established and reviewed with the Patient.    Visit Provider: Kun Lezama MD  Supervising Provider: Kun Lezama MD  [facility] INTERNAL MEDICINE [place]       Luh Blood is a 62 year old who is being seen for an Annual Wellness Visit     HPI  Do you feel safe in your environment? " "Yes    Have you ever done Advance Care Planning? (For example, a Health Directive, POLST, or a discussion with a medical provider or your loved ones about your wishes): Yes, patient will provide a copy.    Fall risk  No history. Able to stand without using arms of chair well.      PHQ2: Score of 0    Cognitive Screening   1) Repeat 3 items (Red, Car, Pencil)   2) Clock draw: NORMAL  3) 3 item recall: Recalls 3 objects  Results: 3 items recalled: COGNITIVE IMPAIRMENT LESS LIKELY    Mini-CogTM Copyright AD Marino. Licensed by the author for use in [employer]; reprinted with permission (rai@[facility]). All rights reserved.      Do you have sleep apnea, excessive snoring or daytime drowsiness?: no    Reviewed and updated as needed this visit by clinical staff   Tobacco  Allergies  Meds   Med Hx  Surg Hx  Fam Hx          Social History     Tobacco Use     Smoking status: Never     Smokeless tobacco: Never   Substance Use Topics     Alcohol use: Yes     Comment: 10 glass of wine a week       Current providers sharing in care for this patient include:   Patient Care Team:  Josue Todd MD as PCP - General (Internal Medicine)  Rosalio Zuleta MD as MD (Family Practice)  Josue Todd MD as Assigned PCP  Jelani Barker MD as MD (Dermatology)    The following health maintenance items are reviewed in Epic and correct as of today:  Health Maintenance Due   Topic Date Due     ADVANCE CARE PLANNING  Never done     SKIN CANCER SCREENING  Never done     YEARLY PREVENTIVE VISIT  01/17/2023               Objective    BP (!) 150/87 (BP Location: Right arm, Patient Position: Sitting, Cuff Size: Adult Regular)   Pulse 70   Ht 1.778 m (5' 10\")   Wt 94.3 kg (207 lb 14.4 oz)   SpO2 97%   BMI 29.83 kg/m   Estimated body mass index is 29.83 kg/m  as calculated from the following:    Height as of this encounter: 1.778 m (5' 10\").    Weight as of this encounter: 94.3 kg (207 lb 14.4 " oz).  Physical Exam  Patient appears comfortable and in no acute distress.        Identified Health Risks: Hx of DVT on lifelong AC, HTN.    Answers for HPI/ROS submitted by the patient on 1/12/2023  General Symptoms: No  Skin Symptoms: No  HENT Symptoms: No  EYE SYMPTOMS: No  HEART SYMPTOMS: No  LUNG SYMPTOMS: No  INTESTINAL SYMPTOMS: No  URINARY SYMPTOMS: No  REPRODUCTIVE SYMPTOMS: No  SKELETAL SYMPTOMS: No  BLOOD SYMPTOMS: No  NERVOUS SYSTEM SYMPTOMS: No  MENTAL HEALTH SYMPTOMS: No

## 2023-01-19 NOTE — NURSING NOTE
Bernabe Dykes is a 62 year old male patient that presents today in clinic for the following:    Chief Complaint   Patient presents with     Physical     Hypertension     The patient's allergies and medications were reviewed as noted. A set of vitals were recorded as noted without incident. The patient does not have any other questions for the provider.    Silva Douglass, EMT at 8:26 AM on 1/19/2023

## 2023-03-14 ENCOUNTER — OFFICE VISIT (OUTPATIENT)
Dept: DERMATOLOGY | Facility: CLINIC | Age: 63
End: 2023-03-14
Payer: COMMERCIAL

## 2023-03-14 DIAGNOSIS — L82.1 SEBORRHEIC KERATOSES: ICD-10-CM

## 2023-03-14 DIAGNOSIS — D48.5 NEOPLASM OF UNCERTAIN BEHAVIOR OF SKIN: ICD-10-CM

## 2023-03-14 DIAGNOSIS — D23.39: ICD-10-CM

## 2023-03-14 DIAGNOSIS — L81.4 SOLAR LENTIGO: ICD-10-CM

## 2023-03-14 DIAGNOSIS — Z12.83 SCREENING EXAM FOR SKIN CANCER: Primary | ICD-10-CM

## 2023-03-14 DIAGNOSIS — D22.9 MULTIPLE BENIGN NEVI: ICD-10-CM

## 2023-03-14 PROCEDURE — 99203 OFFICE O/P NEW LOW 30 MIN: CPT | Mod: 25 | Performed by: STUDENT IN AN ORGANIZED HEALTH CARE EDUCATION/TRAINING PROGRAM

## 2023-03-14 PROCEDURE — 88341 IMHCHEM/IMCYTCHM EA ADD ANTB: CPT | Mod: TC | Performed by: STUDENT IN AN ORGANIZED HEALTH CARE EDUCATION/TRAINING PROGRAM

## 2023-03-14 PROCEDURE — 88341 IMHCHEM/IMCYTCHM EA ADD ANTB: CPT | Mod: 26 | Performed by: DERMATOLOGY

## 2023-03-14 PROCEDURE — 11102 TANGNTL BX SKIN SINGLE LES: CPT | Mod: GC | Performed by: STUDENT IN AN ORGANIZED HEALTH CARE EDUCATION/TRAINING PROGRAM

## 2023-03-14 PROCEDURE — 88342 IMHCHEM/IMCYTCHM 1ST ANTB: CPT | Mod: 26 | Performed by: DERMATOLOGY

## 2023-03-14 PROCEDURE — 88305 TISSUE EXAM BY PATHOLOGIST: CPT | Mod: 26 | Performed by: DERMATOLOGY

## 2023-03-14 NOTE — NURSING NOTE
Dermatology Rooming Note    Bernabe Dykes's goals for this visit include:   Chief Complaint   Patient presents with     Skin Check     FBSE.     Reymundo Seymour, BULMARO-B

## 2023-03-14 NOTE — PATIENT INSTRUCTIONS
Wound Care After a Biopsy    What is a skin biopsy?  A skin biopsy allows the doctor to examine a very small piece of tissue under the microscope to determine the diagnosis and the best treatment for the skin condition. A local anesthetic (numbing medicine)  is injected with a very small needle into the skin area to be tested. A small piece of skin is taken from the area. Sometimes a suture (stitch) is used.     What are the risks of a skin biopsy?  I will experience scar, bleeding, swelling, pain, crusting and redness. I may experience incomplete removal or recurrence. Risks of this procedure are excessive bleeding, bruising, infection, nerve damage, numbness, thick (hypertrophic or keloidal) scar and non-diagnostic biopsy.    How should I care for my wound for the first 24 hours?  Keep the wound dry and covered for 24 hours  If it bleeds, hold direct pressure on the area for 15 minutes. If bleeding does not stop then go to the emergency room  Avoid strenuous exercise the first 1-2 days or as your doctor instructs you    How should I care for the wound after 24 hours?  After 24 hours, remove the bandage  You may bathe or shower as normal  If you had a scalp biopsy, you can shampoo as usual and can use shower water to clean the biopsy site daily  Clean the wound twice a day with gentle soap and water  Do not scrub, be gentle  Apply white petroleum/Vaseline after cleaning the wound with a cotton swab or a clean finger, and keep the site covered with a Bandaid /bandage. Bandages are not necessary with a scalp biopsy  If you are unable to cover the site with a Bandaid /bandage, re-apply ointment 2-3 times a day to keep the site moist. Moisture will help with healing  Avoid strenuous activity for first 1-2 days  Avoid lakes, rivers, pools, and oceans until the stitches are removed or the site is healed    How do I clean my wound?  Wash hands thoroughly with soap or use hand  before all wound care  Clean the  wound with gentle soap and water  Apply white petroleum/Vaseline  to wound after it is clean  Replace the Bandaid /bandage to keep the wound covered for the first few days or as instructed by your doctor  If you had a scalp biopsy, warm shower water to the area on a daily basis should suffice    What should I use to clean my wound?   Cotton-tipped applicators (Qtips )  White petroleum jelly (Vaseline ). Use a clean new container and use Q-tips to apply.  Bandaids   as needed  Gentle soap     How should I care for my wound long term?  Do not get your wound dirty  Keep up with wound care for one week or until the area is healed.  A small scab will form and fall off by itself when the area is completely healed. The area will be red and will become pink in color as it heals. Sun protection is very important for how your scar will turn out. Sunscreen with an SPF 30 or greater is recommended once the area is healed.  If you have stitches, stitches need to be removed in 14 days. You may return to our clinic for this or you may have it done locally at your doctor s office.  You should have some soreness but it should be mild and slowly go away over several days. Talk to your doctor about using tylenol for pain,    When should I call my doctor?  If you have increased:   Pain or swelling  Pus or drainage (clear or slightly yellow drainage is ok)  Temperature over 100F  Spreading redness or warmth around wound    When will I hear about my results?  The biopsy results can take 2 weeks to come back.  Your results will automatically release to [facility] before your provider has even reviewed them.  The clinic will call you with the results, send you a Veeqo message, or have you schedule a follow-up clinic or phone time to discuss the results.  Contact our clinics if you do not hear from us in 2 weeks.    Who should I call with questions?  [facility]: [phone]  [facility]  [place]: [phone]  For urgent needs outside of business hours call the [facility] at [phone] and ask for the dermatology resident on call

## 2023-03-14 NOTE — PROGRESS NOTES
[facility] Dermatology Note  Encounter Date: Mar 14, 2023  Office Visit     Dermatology Problem List:  1. Neoplasm of uncertain behavior, central back s/p shave 3/14/23  2. Blue nevus, left nasal ala  ____________________________________________    Assessment & Plan:     # Neoplasm of uncertain behavior, central back concerning for dysplastic nevus vs MIS/MM  - Shave biopsy performed today (see procedure note(s) below).    # Multiple clinically benign nevi including blue nevus of the left nasal ala  - Discussed benign etiology of these lesions and reassurance provided  - ABCDE's of melanoma reviewed with patient  - Encouraged daily sun protection with SPF 30+ and UPF clothing    # Seborrheic keratoses  - Discussed the natural history and benign nature of these lesions. Reassurance provided that no additional treatment is necessary, however cryotherapy can be performed if inflamed/irritated or bothersome to patient.     # Solar lentigines  Discussed that these are benign pigmented macules due to ultraviolet radiation exposure, usually from the sun. These are commonly found on fair-skinned individuals. Sun protection with SPF 30+, UPF clothing, seeking shade and tanning bed avoidance is important to avoid development of new lesions. Patient informed to notify us of any changes to these lesions.       Procedures Performed:   - Shave biopsy procedure note, location(s): central back. After discussion of benefits and risks including but not limited to bleeding, infection, scar, incomplete removal, recurrence, and non-diagnostic biopsy, written consent and photographs were obtained. The area was cleaned with isopropyl alcohol. 0.5mL of 1% lidocaine with epinephrine was injected to obtain adequate anesthesia of lesion(s). Shave biopsy at site(s) performed. Hemostasis was achieved with aluminium chloride. Petrolatum ointment and a sterile dressing were applied. The patient tolerated the procedure and no  complications were noted. The patient was provided with verbal and written post care instructions.     Follow-up: 1 year(s) in-person, or earlier for new or changing lesions    Staff and Resident:     Sophie Lezama DO (PGY-4)  Dermatology Resident  [facility]    Staff: Dr. Jelani Barker  ____________________________________________    CC: Skin Check (FBSE.)    HPI:  Mr. Bernabe Dykes is a(n) 62 year old male who presents today as a new patient for a skin check. Was seen in 2014 by Dr. Mar for a blue nevus of the nose. The patient denies any new or concerning lesions. No bleeding, painful, pruritic, or changing lesions. They report no personal history of skin cancer. There is no family history of skin cancer. No history of immunosuppression. Remote history of indoor tanning. They occasionally use sunscreen and protective clothing when outdoors for sun protection. Health otherwise stable. No other skin concerns.     Labs Reviewed:  N/A    Physical Exam:  Vitals: There were no vitals taken for this visit.  SKIN: Full skin, which includes the head/face, both arms, chest, back, abdomen,both legs, genitalia and/or groin buttocks, digits and/or nails, was examined.  - Central back with a 8 mm brown macule with asymmetric pigment network and thicker reticular lines at the 2:00 position and less pigmented area at 8:00 position  - Multiple regular brown pigmented macules and papules are identified on the trunk and extremities.   - There are waxy stuck on tan to brown papules on the trunk.   - Blue to gray 2 mm macule of the left nasal ala  - Numerous tan macules in sun exposed areas and the trunk  - No other lesions of concern on areas examined.     Medications:  Current Outpatient Medications   Medication     amLODIPine (NORVASC) 2.5 MG tablet     rivaroxaban ANTICOAGULANT (XARELTO ANTICOAGULANT) 20 MG TABS tablet     No current facility-administered medications for this visit.      Past Medical  History:   Patient Active Problem List   Diagnosis     Skin exam, screening for cancer     Elevated bilirubin     IGT (impaired glucose tolerance)     Other hyperlipidemia     CARDIOVASCULAR SCREENING; LDL GOAL LESS THAN 130     Screening for prostate cancer     Primary hypertension     Past Medical History:   Diagnosis Date     DVT of lower extremity (deep venous thrombosis) (H)     2005 and 2012     HTN (hypertension)      Hyperlipidemia      IGT (impaired glucose tolerance)        CC Referred Self, MD  No address on file on close of this encounter.

## 2023-03-14 NOTE — NURSING NOTE
Lidocaine-epinephrine 1-1:523874 % injection   1.5mL once for one use, starting 3/14/2023 ending 3/14/2023,  2mL disp, R-0, injection  Injected by Dr. Lezama

## 2023-03-14 NOTE — LETTER
3/14/2023       RE: Bernabe Dykes  [address] [facility] [address]     Dear Colleague,    Thank you for referring your patient, Bernabe Dykes, to the [facility] DERMATOLOGY [facility]IC [place] at [facility]. Please see a copy of my visit note below.    [facility] Dermatology Note  Encounter Date: Mar 14, 2023  Office Visit     Dermatology Problem List:  1. Neoplasm of uncertain behavior, central back s/p shave 3/14/23  2. Blue nevus, left nasal ala  ____________________________________________    Assessment & Plan:     # Neoplasm of uncertain behavior, central back concerning for dysplastic nevus vs MIS/MM  - Shave biopsy performed today (see procedure note(s) below).    # Multiple clinically benign nevi including blue nevus of the left nasal ala  - Discussed benign etiology of these lesions and reassurance provided  - ABCDE's of melanoma reviewed with patient  - Encouraged daily sun protection with SPF 30+ and UPF clothing    # Seborrheic keratoses  - Discussed the natural history and benign nature of these lesions. Reassurance provided that no additional treatment is necessary, however cryotherapy can be performed if inflamed/irritated or bothersome to patient.     # Solar lentigines  Discussed that these are benign pigmented macules due to ultraviolet radiation exposure, usually from the sun. These are commonly found on fair-skinned individuals. Sun protection with SPF 30+, UPF clothing, seeking shade and tanning bed avoidance is important to avoid development of new lesions. Patient informed to notify us of any changes to these lesions.       Procedures Performed:   - Shave biopsy procedure note, location(s): central back. After discussion of benefits and risks including but not limited to bleeding, infection, scar, incomplete removal, recurrence, and non-diagnostic biopsy, written consent and photographs were  obtained. The area was cleaned with isopropyl alcohol. 0.5mL of 1% lidocaine with epinephrine was injected to obtain adequate anesthesia of lesion(s). Shave biopsy at site(s) performed. Hemostasis was achieved with aluminium chloride. Petrolatum ointment and a sterile dressing were applied. The patient tolerated the procedure and no complications were noted. The patient was provided with verbal and written post care instructions.     Follow-up: 1 year(s) in-person, or earlier for new or changing lesions    Staff and Resident:     Sophie Lezama DO (PGY-4)  Dermatology Resident  [facility]    Staff: Dr. Jelani Barker  ____________________________________________    CC: Skin Check (FBSE.)    HPI:  Mr. Bernabe Dykes is a(n) 62 year old male who presents today as a new patient for a skin check. Was seen in 2014 by Dr. Mar for a blue nevus of the nose. The patient denies any new or concerning lesions. No bleeding, painful, pruritic, or changing lesions. They report no personal history of skin cancer. There is no family history of skin cancer. No history of immunosuppression. Remote history of indoor tanning. They occasionally use sunscreen and protective clothing when outdoors for sun protection. Health otherwise stable. No other skin concerns.     Labs Reviewed:  N/A    Physical Exam:  Vitals: There were no vitals taken for this visit.  SKIN: Full skin, which includes the head/face, both arms, chest, back, abdomen,both legs, genitalia and/or groin buttocks, digits and/or nails, was examined.  - Central back with a 8 mm brown macule with asymmetric pigment network and thicker reticular lines at the 2:00 position and less pigmented area at 8:00 position  - Multiple regular brown pigmented macules and papules are identified on the trunk and extremities.   - There are waxy stuck on tan to brown papules on the trunk.   - Blue to gray 2 mm macule of the left nasal ala  - Numerous tan macules in sun  exposed areas and the trunk  - No other lesions of concern on areas examined.     Medications:  Current Outpatient Medications   Medication     amLODIPine (NORVASC) 2.5 MG tablet     rivaroxaban ANTICOAGULANT (XARELTO ANTICOAGULANT) 20 MG TABS tablet     No current facility-administered medications for this visit.      Past Medical History:   Patient Active Problem List   Diagnosis     Skin exam, screening for cancer     Elevated bilirubin     IGT (impaired glucose tolerance)     Other hyperlipidemia     CARDIOVASCULAR SCREENING; LDL GOAL LESS THAN 130     Screening for prostate cancer     Primary hypertension     Past Medical History:   Diagnosis Date     DVT of lower extremity (deep venous thrombosis) (H)     2005 and 2012     HTN (hypertension)      Hyperlipidemia      IGT (impaired glucose tolerance)        CC Referred Self, MD  No address on file on close of this encounter.

## 2023-03-17 LAB
PATH REPORT.COMMENTS IMP SPEC: NORMAL
PATH REPORT.FINAL DX SPEC: NORMAL
PATH REPORT.GROSS SPEC: NORMAL
PATH REPORT.MICROSCOPIC SPEC OTHER STN: NORMAL
PATH REPORT.RELEVANT HX SPEC: NORMAL

## 2023-03-22 ENCOUNTER — TELEPHONE (OUTPATIENT)
Dept: DERMATOLOGY | Facility: CLINIC | Age: 63
End: 2023-03-22

## 2023-03-22 ENCOUNTER — OFFICE VISIT (OUTPATIENT)
Dept: INTERNAL MEDICINE | Facility: CLINIC | Age: 63
End: 2023-03-22
Payer: COMMERCIAL

## 2023-03-22 VITALS
BODY MASS INDEX: 29.83 KG/M2 | OXYGEN SATURATION: 99 % | DIASTOLIC BLOOD PRESSURE: 88 MMHG | RESPIRATION RATE: 20 BRPM | HEIGHT: 70 IN | HEART RATE: 62 BPM | SYSTOLIC BLOOD PRESSURE: 136 MMHG

## 2023-03-22 DIAGNOSIS — Z13.89 ENCOUNTER FOR SURVEILLANCE OF ABNORMAL NEVI: Primary | ICD-10-CM

## 2023-03-22 DIAGNOSIS — I10 PRIMARY HYPERTENSION: ICD-10-CM

## 2023-03-22 PROCEDURE — 99213 OFFICE O/P EST LOW 20 MIN: CPT | Performed by: INTERNAL MEDICINE

## 2023-03-22 RX ORDER — AMLODIPINE BESYLATE 5 MG/1
5 TABLET ORAL AT BEDTIME
Qty: 90 TABLET | Refills: 3 | Status: SHIPPED | OUTPATIENT
Start: 2023-03-22 | End: 2024-02-07

## 2023-03-22 NOTE — TELEPHONE ENCOUNTER
[facility] Call Center    Phone Message    May a detailed message be left on voicemail: yes     Reason for Call: Other: Pt states he is calling to discuss his biopsy and the next steps, Please call Pt back to discuss, thanks.      Action Taken: Message routed to:  [facility] derm    Travel Screening: Not Applicable

## 2023-03-22 NOTE — PROGRESS NOTES
"HPI  62-year-old presents today for follow-up regarding his blood pressure.  He has been monitoring his blood pressure at home and it been running a little higher than what were seen today by his report.  He states that only occasionally will drop in the 130s over 80s.  He has had no problems on the amlodipine he is tolerated this well.  Did have a 24-hour blood pressure monitor study last year which also showed elevated blood pressure.  He is eating a high salt diet although he is restricted the red meat.  He is doing some exercise 3 days a week for about 30 to 40 minutes combination of strength training and cardiovascular training.  He also had a excision of an atypical mole last week with recommendation for wider excision.  Past Medical History:   Diagnosis Date     DVT of lower extremity (deep venous thrombosis) (H)     2005 and 2012     HTN (hypertension)      Hyperlipidemia      IGT (impaired glucose tolerance)      Past Surgical History:   Procedure Laterality Date     COLONOSCOPY  2/17/2014    Procedure: COLONOSCOPY;;  Surgeon: Nathan Olmedo MD;  Location:  GI     ORTHOPEDIC SURGERY  1978    left knee repair     ORTHOPEDIC SURGERY      left wrist repair, metal      Family History   Problem Relation Age of Onset     Coronary Artery Disease Father 54        CABG, ICD     Obesity Father      Diabetes Father      Diabetes Mother      Obesity Mother          Exam:  /88 (BP Location: Right arm, Patient Position: Sitting, Cuff Size: Adult Large)   Pulse 62   Resp 20   Ht 1.778 m (5' 10\")   SpO2 99%   BMI 29.83 kg/m    The patient is alert, oriented with a clear sensorium.   Skin shows no lesions or rashes and good turgor.   Head is normocephalic and atraumatic.    Neck shows no nodes, thyromegaly.     Lungs are clear.   Heart shows normal S1 and S2 without murmur or gallop.    Extremities show no edema.          ASSESSMENT  1 Hypertension   2  impaired glucose tolerance   3 hyperlipidemia   4 recurrent " DVT  5 atypical mole needs wide excision  6 history elevated bilirubin likely Gilbert's syndrome      Plan  We will refer back to dermatology for the wide excision we will increase the amlodipine to 5 mg at bedtime and have him monitor the blood pressure at home and send us an update in a couple of months.  We discussed nonpharmacologic blood pressure control measures especially sodium restriction in light of his current diet.  This note was completed using Dragon voice recognition software.      Josue Todd MD  General Internal Medicine  [facility]  [phone]

## 2023-03-22 NOTE — NURSING NOTE
Bernabe Dykes is a 62 year old male that presents in clinic today for the following:     Chief Complaint   Patient presents with     Follow Up     Hypertension       The patient's allergies and medications were reviewed. The patient's vitals were obtained without incident. The patient does not have any other questions for the provider.     Rosalio Jarvis, EMT at 8:31 AM on 3/22/2023.  Primary Care Clinic: [phone]

## 2023-03-22 NOTE — CONFIDENTIAL NOTE
Called and gave results. The patient stated he would like to schedule the excision at the [place] location. Placing referral for excision on central back Junctional dysplastic nevus with severe atypia with Dr. Barker at [facility]

## 2023-03-23 ENCOUNTER — TELEPHONE (OUTPATIENT)
Dept: DERMATOLOGY | Facility: CLINIC | Age: 63
End: 2023-03-23
Payer: COMMERCIAL

## 2023-03-23 NOTE — TELEPHONE ENCOUNTER
Called back and scheduled excision per Dr. Barker    Thank you,  Charlene BARRIGA RN  Dermatology   [phone]

## 2023-03-23 NOTE — TELEPHONE ENCOUNTER
"[facility] Call Center    Phone Message    May a detailed message be left on voicemail: yes     Reason for Call: Appointment Intake    Referring Provider Name: Dr Jelani Barker and Dr Josue Todd  Diagnosis and/or Symptoms: Referral states \"excision--central back Junctional dysplastic nevus with severe atypia\" Both Dr Barker and Dr Todd wrote referrals for this, Dr Barker already saw pt and did biopsy on 3/14, pt just needs to schedule excision. Please call pt to discuss scheduling this, thanks!    Action Taken: Message routed to:  Other: [facility]    Travel Screening: Not Applicable                                                                        "

## 2023-03-23 NOTE — TELEPHONE ENCOUNTER
Called and discussed results and to scheduled excision, no times for excision in the timeframe. Will route to Dr. Barker to see where he wants to schedule patient. Pt voiced understanding.    Thank you,  Charlene BARRIGA RN  Dermatology   [phone]

## 2023-04-06 DIAGNOSIS — Z13.6 CARDIOVASCULAR SCREENING; LDL GOAL LESS THAN 130: Primary | ICD-10-CM

## 2023-04-06 DIAGNOSIS — I82.403 RECURRENT DEEP VEIN THROMBOSIS (DVT) OF BOTH LOWER EXTREMITIES (H): ICD-10-CM

## 2023-04-11 NOTE — TELEPHONE ENCOUNTER
XARELTO 20MG TABS  Last Written Prescription Date:  1/9/2023  Last Fill Quantity: 90,   # refills: 0  Last Office Visit :  3/22/2023  Future Office visit:  None    Routing refill request to provider for review/approval because:  Second Request  Needing updated PLT on file  Please call Pt to schedule.       Recent Labs   Lab Test 01/17/22  1018            Creatinine  1/19/2023 0.67 - 1.17 mg/dL 1.08      Nerissa Barroso RN  Central Triage Red Flags/Med Refills

## 2023-04-17 ENCOUNTER — OFFICE VISIT (OUTPATIENT)
Dept: DERMATOLOGY | Facility: CLINIC | Age: 63
End: 2023-04-17
Payer: COMMERCIAL

## 2023-04-17 ENCOUNTER — MYC MEDICAL ADVICE (OUTPATIENT)
Dept: INTERNAL MEDICINE | Facility: CLINIC | Age: 63
End: 2023-04-17

## 2023-04-17 DIAGNOSIS — D22.9 ATYPICAL NEVUS: Primary | ICD-10-CM

## 2023-04-17 DIAGNOSIS — D48.5 NEOPLASM OF UNCERTAIN BEHAVIOR OF SKIN: ICD-10-CM

## 2023-04-17 PROCEDURE — 88305 TISSUE EXAM BY PATHOLOGIST: CPT | Performed by: DERMATOLOGY

## 2023-04-17 PROCEDURE — 11402 EXC TR-EXT B9+MARG 1.1-2 CM: CPT | Performed by: STUDENT IN AN ORGANIZED HEALTH CARE EDUCATION/TRAINING PROGRAM

## 2023-04-17 PROCEDURE — 13101 CMPLX RPR TRUNK 2.6-7.5 CM: CPT | Performed by: STUDENT IN AN ORGANIZED HEALTH CARE EDUCATION/TRAINING PROGRAM

## 2023-04-17 NOTE — PROGRESS NOTES
DERMATOLOGIC SURGERY REPORT    NAME OF PROCEDURE:  EXCISION AND CLOSURE    Surgeon:  Jelani Barker MD    PREOPERATIVE DIAGNOSIS: Junctional nevus w/ high grade atypia  POSTOPERATIVE DIAGNOSIS: Same  FINAL EXCISION SIZE: 11 mm lesion with 3mm margins  Repair type: Linear  FINAL REPAIR LENGTH:  44mm  Location: Back  Prior Biopsy Accession #: [id number]    INDICATIONS:Excision was indicated for treatment. We discussed the principles of treatment and most likely complications including bleeding, infection, wound dehiscence, pain, nerve damage, and scarring. Informed consent was obtained and the patient underwent the procedure as follows.    PROCEDURE:  The patient was taken to the operative suite. The treatment area was anesthetized with 1% lidocaine with 1:938704 epinephrine buffered with bicarbonate. The area was washed with hibiclens, rinsed with saline and draped with sterile towels. The lesion was delineated and excised down to subcutaneous fat. Hemostasis was obtained by electrocoagulation.     REPAIR:  In order to repair this defect while maintaining the normal anatomic relations and function, we elected to utilize a linear closure. Closure was oriented so that the wound was in the patient's natural skin tension lines. Two redundant cones were removed by triangulation. Due to tightness of the surrounding skin deeper layers of the subcutaneous tissue were undermined extensively to a distance  greater than width of the defect on both sides by dissection in the subcutaneous plane until adequate tissue mobility was obtained. Deep dermal and subcutaneous layer closure was performed using 3-0 vicryl deep, intradermal and subcutaneous sutures.  Final cutaneous approximation was achieved with 4-0 PDS subcutaneous running sutures.      A total of 5mL of anesthesia was administered for all surgical sites. Estimated blood loss was less than 5mL for all surgical sites. A sterile pressure dressing was applied and wound care  instructions, with a written handout, were given. The patient was discharged alert and ambulatory.    Jelani Barker M.D.      Department of Dermatology

## 2023-04-17 NOTE — PATIENT INSTRUCTIONS
Excision Wound Care Instructions  I will experience scar, altered skin color, bleeding, swelling, pain, crusting and redness. I may experience altered sensation. Risks are excessive bleeding, infection, muscle weakness, thick (hypertrophic or keloidal) scar, and recurrence. A second procedure may be recommended to obtain the best cosmetic or functional result.  Possible complications of any surgical procedure are bleeding, infection, scarring, alteration in skin color and sensation, muscle weakness in the area, wound dehiscence or seperation, or recurrence of the lesion or disease. On occasion, after healing, a secondary procedure or revision may be recommended in order to obtain the best cosmetic or functional result.   After your surgery, a pressure bandage will be placed over the area that has sutures. This will help prevent bleeding. Please follow these instructions as they will help you to prevent complications as your wound heals.  For the First 24 hours After Surgery:  Leave the pressure bandage on and keep it dry. If it should come loose, you may retape it, but do not take it off.  Relax and take it easy. Do not do any vigorous exercise, heavy lifting, or bending forward. This could cause the wound to bleed.  Post-operative pain is usually mild. You may alternate between 1000 mg of Tylenol (acetaminophen) and 400 mg of Ibuprofen every 4 hours.  Do not take more than 4,000mg of acetaminophen in a 24 hour period or 3200 mg of Ibuprofen in a 24 hr period.  Avoid alcohol and vitamin E as these may increase your tendency to bleed.  You may put an ice pack around the bandaged area for 20 minutes every 2-3 hours. This may help reduce swelling, bruising, and pain. Make sure the ice pack is waterproof so that the pressure bandage does not get wet.   You may see a small amount of drainage or blood on your pressure bandage. This is normal. However, if drainage or bleeding continues or saturates the bandage, you will  need to apply firm pressure over the bandage with a washcloth for 15 minutes. If bleeding continues after applying pressure for 15 minutes then go to the nearest emergency room.  After the first 24 hours from Surgery  Carefully remove the bandage and start daily wound care and dressing changes. You may also now shower and get the wound wet.  Daily Wound Care:  Wash wound with a mild soap and water.  Use caution when washing the wound, be gentle and do not let the forceful shower stream hit the wound directly.  Pat dry.  Apply Vaseline (from a new container or tube) over the suture line with a Q-tip. It is very important to keep the wound continuously moist, as wounds heal best in a moist environment.  If you had stitches placed keep the site covered until sutures have either been removed or dissolve.  You can cover it with a Telfa (non-stick) dressing and tape or a band-aid.    If you are unable to keep wound covered, you must apply Vaseline every 2-3 hours (while awake) to ensure it is being kept moist for optimal healing. A dressing overnight is recommended to keep the area moist.  Call Us If:  You have pain that is not controlled with Tylenol/Ibuprofen  You have signs or symptoms of an infection, such as: fever over 100 degrees F, redness, warmth, or foul-smelling or yellow drainage from the wound.  Who should I call with questions?  [facility]: [phone]   [facility]: [phone]  [facility]: [phone]  For urgent needs outside of business hours call the [facility] at [phone] and ask to speak with the dermatology resident on call

## 2023-04-17 NOTE — LETTER
4/17/2023         RE: Bernabe Dykes  [address] [facility] [address]        Dear Colleague,    Thank you for referring your patient, Bernabe Dykes, to the [facility]. Please see a copy of my visit note below.    DERMATOLOGIC SURGERY REPORT    NAME OF PROCEDURE:  EXCISION AND CLOSURE    Surgeon:  Jelani Barker MD    PREOPERATIVE DIAGNOSIS: Junctional nevus w/ high grade atypia  POSTOPERATIVE DIAGNOSIS: Same  FINAL EXCISION SIZE: 11 mm lesion with 3mm margins  Repair type: Linear  FINAL REPAIR LENGTH:  44mm  Location: Back  Prior Biopsy Accession #: [id number]    INDICATIONS:Excision was indicated for treatment. We discussed the principles of treatment and most likely complications including bleeding, infection, wound dehiscence, pain, nerve damage, and scarring. Informed consent was obtained and the patient underwent the procedure as follows.    PROCEDURE:  The patient was taken to the operative suite. The treatment area was anesthetized with 1% lidocaine with 1:301050 epinephrine buffered with bicarbonate. The area was washed with hibiclens, rinsed with saline and draped with sterile towels. The lesion was delineated and excised down to subcutaneous fat. Hemostasis was obtained by electrocoagulation.     REPAIR:  In order to repair this defect while maintaining the normal anatomic relations and function, we elected to utilize a linear closure. Closure was oriented so that the wound was in the patient's natural skin tension lines. Two redundant cones were removed by triangulation. Due to tightness of the surrounding skin deeper layers of the subcutaneous tissue were undermined extensively to a distance  greater than width of the defect on both sides by dissection in the subcutaneous plane until adequate tissue mobility was obtained. Deep dermal and subcutaneous layer closure was performed using 3-0 vicryl deep, intradermal and subcutaneous sutures.   Final cutaneous approximation was achieved with 4-0 PDS subcutaneous running sutures.      A total of 5mL of anesthesia was administered for all surgical sites. Estimated blood loss was less than 5mL for all surgical sites. A sterile pressure dressing was applied and wound care instructions, with a written handout, were given. The patient was discharged alert and ambulatory.    Jelani Barker M.D.      Department of Dermatology           Again, thank you for allowing me to participate in the care of your patient.        Sincerely,        Jelani Barker MD

## 2023-04-19 LAB
PATH REPORT.COMMENTS IMP SPEC: NORMAL
PATH REPORT.COMMENTS IMP SPEC: NORMAL
PATH REPORT.FINAL DX SPEC: NORMAL
PATH REPORT.GROSS SPEC: NORMAL
PATH REPORT.MICROSCOPIC SPEC OTHER STN: NORMAL
PATH REPORT.RELEVANT HX SPEC: NORMAL

## 2023-07-12 DIAGNOSIS — I82.403 RECURRENT DEEP VEIN THROMBOSIS (DVT) OF BOTH LOWER EXTREMITIES (H): ICD-10-CM

## 2023-07-14 ENCOUNTER — TELEPHONE (OUTPATIENT)
Dept: INTERNAL MEDICINE | Facility: CLINIC | Age: 63
End: 2023-07-14
Payer: COMMERCIAL

## 2023-07-14 NOTE — TELEPHONE ENCOUNTER
[facility] Call Center    Phone Message    May a detailed message be left on voicemail: yes     Reason for Call: Medication Refill Request    Has the patient contacted the pharmacy for the refill? Yes   Name of medication being requested: rivaroxaban ANTICOAGULANT (XARELTO ANTICOAGULANT) 20 MG TABS tablet  Provider who prescribed the medication: Yola  Pharmacy:    [facility] [address]  Date medication is needed: 07/14/2023, he will be out of this medication by Sunday, please refill asap.    Action Taken: Other: PCC    Travel Screening: Not Applicable

## 2023-07-14 NOTE — TELEPHONE ENCOUNTER
rivaroxaban ANTICOAGULANT (XARELTO ANTICOAGULANT) 20 MG TABS tablet  2nd request pt call, 7-14-23,resending high priority  pt comment;  Date medication is needed: 07/14/2023, he will be out of this medication by Sunday, please refill asap.

## 2023-08-11 ENCOUNTER — MYC MEDICAL ADVICE (OUTPATIENT)
Dept: INTERNAL MEDICINE | Facility: CLINIC | Age: 63
End: 2023-08-11
Payer: COMMERCIAL

## 2024-02-07 ENCOUNTER — LAB (OUTPATIENT)
Dept: LAB | Facility: CLINIC | Age: 64
End: 2024-02-07
Payer: COMMERCIAL

## 2024-02-07 ENCOUNTER — OFFICE VISIT (OUTPATIENT)
Dept: INTERNAL MEDICINE | Facility: CLINIC | Age: 64
End: 2024-02-07
Payer: COMMERCIAL

## 2024-02-07 VITALS
WEIGHT: 213 LBS | BODY MASS INDEX: 31.55 KG/M2 | HEIGHT: 69 IN | HEART RATE: 68 BPM | OXYGEN SATURATION: 95 % | SYSTOLIC BLOOD PRESSURE: 135 MMHG | DIASTOLIC BLOOD PRESSURE: 87 MMHG

## 2024-02-07 DIAGNOSIS — I10 PRIMARY HYPERTENSION: Primary | ICD-10-CM

## 2024-02-07 DIAGNOSIS — E78.49 OTHER HYPERLIPIDEMIA: ICD-10-CM

## 2024-02-07 DIAGNOSIS — R73.02 IGT (IMPAIRED GLUCOSE TOLERANCE): ICD-10-CM

## 2024-02-07 DIAGNOSIS — I10 PRIMARY HYPERTENSION: ICD-10-CM

## 2024-02-07 DIAGNOSIS — Z12.11 SPECIAL SCREENING FOR MALIGNANT NEOPLASMS, COLON: ICD-10-CM

## 2024-02-07 DIAGNOSIS — I82.403 RECURRENT DEEP VEIN THROMBOSIS (DVT) OF BOTH LOWER EXTREMITIES (H): ICD-10-CM

## 2024-02-07 DIAGNOSIS — Z13.6 CARDIOVASCULAR SCREENING; LDL GOAL LESS THAN 130: ICD-10-CM

## 2024-02-07 LAB
ALBUMIN SERPL BCG-MCNC: 4.4 G/DL (ref 3.5–5.2)
ALP SERPL-CCNC: 46 U/L (ref 40–150)
ALT SERPL W P-5'-P-CCNC: 35 U/L (ref 0–70)
ANION GAP SERPL CALCULATED.3IONS-SCNC: 10 MMOL/L (ref 7–15)
AST SERPL W P-5'-P-CCNC: 31 U/L (ref 0–45)
BILIRUB SERPL-MCNC: 1.4 MG/DL
BUN SERPL-MCNC: 13.1 MG/DL (ref 8–23)
CALCIUM SERPL-MCNC: 9.5 MG/DL (ref 8.8–10.2)
CHLORIDE SERPL-SCNC: 105 MMOL/L (ref 98–107)
CHOLEST SERPL-MCNC: 207 MG/DL
CREAT SERPL-MCNC: 1.08 MG/DL (ref 0.67–1.17)
DEPRECATED HCO3 PLAS-SCNC: 26 MMOL/L (ref 22–29)
EGFRCR SERPLBLD CKD-EPI 2021: 77 ML/MIN/1.73M2
ERYTHROCYTE [DISTWIDTH] IN BLOOD BY AUTOMATED COUNT: 12.7 % (ref 10–15)
FASTING STATUS PATIENT QL REPORTED: YES
GLUCOSE SERPL-MCNC: 98 MG/DL (ref 70–99)
HBA1C MFR BLD: 5.4 %
HCT VFR BLD AUTO: 46.7 % (ref 40–53)
HDLC SERPL-MCNC: 50 MG/DL
HGB BLD-MCNC: 16.2 G/DL (ref 13.3–17.7)
LDLC SERPL CALC-MCNC: 141 MG/DL
MCH RBC QN AUTO: 30.9 PG (ref 26.5–33)
MCHC RBC AUTO-ENTMCNC: 34.7 G/DL (ref 31.5–36.5)
MCV RBC AUTO: 89 FL (ref 78–100)
NONHDLC SERPL-MCNC: 157 MG/DL
PLATELET # BLD AUTO: 274 10E3/UL (ref 150–450)
POTASSIUM SERPL-SCNC: 4.3 MMOL/L (ref 3.4–5.3)
PROT SERPL-MCNC: 7.2 G/DL (ref 6.4–8.3)
RBC # BLD AUTO: 5.24 10E6/UL (ref 4.4–5.9)
SODIUM SERPL-SCNC: 141 MMOL/L (ref 135–145)
TRIGL SERPL-MCNC: 80 MG/DL
WBC # BLD AUTO: 5.6 10E3/UL (ref 4–11)

## 2024-02-07 PROCEDURE — 90715 TDAP VACCINE 7 YRS/> IM: CPT | Performed by: INTERNAL MEDICINE

## 2024-02-07 PROCEDURE — 80053 COMPREHEN METABOLIC PANEL: CPT | Performed by: PATHOLOGY

## 2024-02-07 PROCEDURE — 99000 SPECIMEN HANDLING OFFICE-LAB: CPT | Performed by: PATHOLOGY

## 2024-02-07 PROCEDURE — 36415 COLL VENOUS BLD VENIPUNCTURE: CPT | Performed by: PATHOLOGY

## 2024-02-07 PROCEDURE — 90471 IMMUNIZATION ADMIN: CPT | Performed by: INTERNAL MEDICINE

## 2024-02-07 PROCEDURE — 85027 COMPLETE CBC AUTOMATED: CPT | Performed by: PATHOLOGY

## 2024-02-07 PROCEDURE — 99396 PREV VISIT EST AGE 40-64: CPT | Mod: 25 | Performed by: INTERNAL MEDICINE

## 2024-02-07 PROCEDURE — 83036 HEMOGLOBIN GLYCOSYLATED A1C: CPT | Performed by: INTERNAL MEDICINE

## 2024-02-07 PROCEDURE — 80061 LIPID PANEL: CPT | Performed by: PATHOLOGY

## 2024-02-07 RX ORDER — AMLODIPINE BESYLATE 5 MG/1
5 TABLET ORAL AT BEDTIME
Qty: 90 TABLET | Refills: 3 | Status: SHIPPED | OUTPATIENT
Start: 2024-02-07

## 2024-02-07 NOTE — PROGRESS NOTES
Jeremi is a 63 year old that presents in clinic today for the following:     Chief Complaint   Patient presents with    Physical           2/7/2024     8:45 AM   Additional Questions   Roomed by Renetta Ramsey     Screenings as of today     PHQ-2 Total Score (Adult) - Positive if 3 or more points; Administer   PHQ-9 if positive 0        Renetta Ramsey at 8:51 AM on 2/7/2024

## 2024-02-07 NOTE — PROGRESS NOTES
"HPI  63-year-old seen today for physical examination.  He has been doing quite well.  Combination of strength training and cardiovascular training is tolerating this well.  He has been eating healthy restricting the salt in his diet increasing the fruits and vegetables limiting the red meat.  He does have some sleep fragmentation we talked about using oral disintegrating melatonin middle the night if necessary.  He uses occasional alcohol no tobacco.  He has no specific complaints or concerns today.  Past Medical History:   Diagnosis Date    DVT of lower extremity (deep venous thrombosis) (H)     2005 and 2012    HTN (hypertension)     Hyperlipidemia     IGT (impaired glucose tolerance)      Past Surgical History:   Procedure Laterality Date    BIOPSY  March 2023    COLONOSCOPY  02/17/2014    Procedure: COLONOSCOPY;;  Surgeon: Nathan Olmedo MD;  Location:  GI    ORTHOPEDIC SURGERY  01/01/1978    left knee repair    ORTHOPEDIC SURGERY      left wrist repair, metal      Family History   Problem Relation Age of Onset    Coronary Artery Disease Father 54        CABG, ICD    Obesity Father     Diabetes Father     Hypertension Father     Diabetes Mother     Obesity Mother     Hypertension Mother          Exam:  /87 (BP Location: Right arm, Patient Position: Sitting, Cuff Size: Adult Regular)   Pulse 68   Ht 1.759 m (5' 9.25\")   Wt 96.6 kg (213 lb)   SpO2 95%   BMI 31.23 kg/m    213 lbs 0 oz  Physical Exam   The patient is alert, oriented with a clear sensorium.   Skin shows no lesions or rashes and good turgor.   Head is normocephalic and atraumatic.   Eyes show PERRLA  Ears show normal TMs bilaterally.   Mouth shows clear oral mucosa.   Neck shows no nodes, thyromegaly or bruits.   Back is non tender.  Lungs are clear to percussion and auscultation.   Heart shows normal S1 and S2 without murmur or gallop.   Abdomen is soft and nontender without masses or organomegaly.   Genitalia show normal testes. No " evidence of inguinal hernia.  Extremities show no edema and no evidence of active synovitis.   Neurologic examination shows cranial nerves II-XII intact. Motor shows 5/5 strength. Reflexes are symmetric. Cerebellar testing shows normal tandem gait.  Romberg negative.    Labs reviewed:  Results for orders placed or performed in visit on 02/07/24   CBC with platelets     Status: Normal   Result Value Ref Range    WBC Count 5.6 4.0 - 11.0 10e3/uL    RBC Count 5.24 4.40 - 5.90 10e6/uL    Hemoglobin 16.2 13.3 - 17.7 g/dL    Hematocrit 46.7 40.0 - 53.0 %    MCV 89 78 - 100 fL    MCH 30.9 26.5 - 33.0 pg    MCHC 34.7 31.5 - 36.5 g/dL    RDW 12.7 10.0 - 15.0 %    Platelet Count 274 150 - 450 10e3/uL   Comprehensive metabolic panel     Status: Abnormal   Result Value Ref Range    Sodium 141 135 - 145 mmol/L    Potassium 4.3 3.4 - 5.3 mmol/L    Carbon Dioxide (CO2) 26 22 - 29 mmol/L    Anion Gap 10 7 - 15 mmol/L    Urea Nitrogen 13.1 8.0 - 23.0 mg/dL    Creatinine 1.08 0.67 - 1.17 mg/dL    GFR Estimate 77 >60 mL/min/1.73m2    Calcium 9.5 8.8 - 10.2 mg/dL    Chloride 105 98 - 107 mmol/L    Glucose 98 70 - 99 mg/dL    Alkaline Phosphatase 46 40 - 150 U/L    AST 31 0 - 45 U/L    ALT 35 0 - 70 U/L    Protein Total 7.2 6.4 - 8.3 g/dL    Albumin 4.4 3.5 - 5.2 g/dL    Bilirubin Total 1.4 (H) <=1.2 mg/dL   Lipid Profile     Status: Abnormal   Result Value Ref Range    Cholesterol 207 (H) <200 mg/dL    Triglycerides 80 <150 mg/dL    Direct Measure HDL 50 >=40 mg/dL    LDL Cholesterol Calculated 141 (H) <=100 mg/dL    Non HDL Cholesterol 157 (H) <130 mg/dL    Patient Fasting > 8hrs? Yes     Narrative    Cholesterol  Desirable:  <200 mg/dL    Triglycerides  Normal:  Less than 150 mg/dL  Borderline High:  150-199 mg/dL  High:  200-499 mg/dL  Very High:  Greater than or equal to 500 mg/dL    Direct Measure HDL  Female:  Greater than or equal to 50 mg/dL   Male:  Greater than or equal to 40 mg/dL    LDL Cholesterol  Desirable:   <100mg/dL  Above Desirable:  100-129 mg/dL   Borderline High:  130-159 mg/dL   High:  160-189 mg/dL   Very High:  >= 190 mg/dL    Non HDL Cholesterol  Desirable:  130 mg/dL  Above Desirable:  130-159 mg/dL  Borderline High:  160-189 mg/dL  High:  190-219 mg/dL  Very High:  Greater than or equal to 220 mg/dL   Hemoglobin A1c     Status: Normal   Result Value Ref Range    Hemoglobin A1C 5.4 <5.7 %       ASSESSMENT  1 Hypertension improved control  2 IGT  3 hyperlipidemia needs rosuvastatin  4 recurrent DVT  5 history elevated bilirubin likely Gilbert's syndrome     Plan  He is due for colonoscopy and a Tdap and will arrange for both of these.  Will reassess his labs refilled his amlodipine and rivaroxaban and plan reassessment in a year.  This note was completed using Dragon voice recognition software.      Josue Todd MD  General Internal Medicine  [facility]  [phone]

## 2024-02-09 DIAGNOSIS — E78.49 OTHER HYPERLIPIDEMIA: ICD-10-CM

## 2024-02-09 RX ORDER — ROSUVASTATIN CALCIUM 10 MG/1
10 TABLET, COATED ORAL DAILY
Qty: 90 TABLET | Refills: 3 | Status: SHIPPED | OUTPATIENT
Start: 2024-02-09

## 2024-02-09 NOTE — TELEPHONE ENCOUNTER
Patient requested [facility] be the servicing pharmacy for this prescription.    Thanks!  Serina Duggan, Pharmacy Coordinator  [facility]  [phone]

## 2024-02-12 DIAGNOSIS — I82.403 RECURRENT DEEP VEIN THROMBOSIS (DVT) OF BOTH LOWER EXTREMITIES (H): ICD-10-CM

## 2024-02-12 NOTE — TELEPHONE ENCOUNTER
Jeremi would like to keep his Xarelto at [facility] Thank you.    LISA Landry., [facility]  Lead Pharmacy Coordinator  [facility]  [phone]

## 2024-03-01 ENCOUNTER — TELEPHONE (OUTPATIENT)
Dept: GASTROENTEROLOGY | Facility: CLINIC | Age: 64
End: 2024-03-01
Payer: COMMERCIAL

## 2024-03-01 NOTE — TELEPHONE ENCOUNTER
"Endoscopy Scheduling Screen    Have you had a positive Covid test in the last 14 days?  No    Are you active on MyChart?   Yes    What insurance is in the chart?  Other:  [facility]    Ordering/Referring Provider: KITA RICHARD   (If ordering provider performs procedure, schedule with ordering provider unless otherwise instructed. )    BMI: Estimated body mass index is 31.23 kg/m  as calculated from the following:    Height as of 2/7/24: 1.759 m (5' 9.25\").    Weight as of 2/7/24: 96.6 kg (213 lb).     Sedation Ordered  moderate sedation.   If patient BMI > 50 do not schedule in [facility].    If patient BMI > 45 do not schedule at [facility].    Are you taking methadone or Suboxone?  No    Have you had difficulties, pain, or discomfort during past endoscopy procedures?  No    Are you taking any prescription medications for pain 3 or more times per week?   NO - No RN review required.    Do you have a history of malignant hyperthermia or adverse reaction to anesthesia?  No    (Females) Are you currently pregnant?   No     Have you been diagnosed or told you have pulmonary hypertension?   No    Do you have an LVAD?  No    Have you been told you have moderate to severe sleep apnea?  No    Have you been told you have COPD, asthma, or any other lung disease?  No    Do you have any heart conditions?  No     Have you ever had an organ transplant?   No    Have you ever had or are you awaiting a heart or lung transplant?   No    Have you had a stroke or transient ischemic attack (TIA aka \"mini stroke\" in the last 6 months?   No    Have you been diagnosed with or been told you have cirrhosis of the liver?   No    Are you currently on dialysis?   No    Do you need assistance transferring?   No    BMI: Estimated body mass index is 31.23 kg/m  as calculated from the following:    Height as of 2/7/24: 1.759 m (5' 9.25\").    Weight as of 2/7/24: 96.6 kg (213 lb).     Is patients BMI > 40 and scheduling location [facility]?  No    Do you take an " injectable medication for weight loss or diabetes (excluding insulin)?  No    Do you take the medication Naltrexone?  No    Do you take blood thinners?  Yes     Are you taking Effient/Prasugrel?  No, you must contact your prescribing provider for direction on holding or bridging with a different medication.       Prep   Are you currently on dialysis or do you have chronic kidney disease?  No    Do you have a diagnosis of diabetes?  No    Do you have a diagnosis of cystic fibrosis (CF)?  No    On a regular basis do you go 3 -5 days between bowel movements?  No    BMI > 40?  No    Preferred Pharmacy:    [id number] - [place], [place] - [address]  [address]  [facility] [address]  Phone: [phone] Fax: [phone]      Final Scheduling Details   Colonoscopy prep sent?  N/A    Procedure scheduled  Colonoscopy    Surgeon:  GRABIEL     Date of procedure:  4/29/24     Pre-OP / PAC:   No - Not required for this site.    Location  [facility] - Patient preference.    Sedation   Moderate Sedation - Per order.      Patient Reminders:   You will receive a call from a Nurse to review instructions and health history.  This assessment must be completed prior to your procedure.  Failure to complete the Nurse assessment may result in the procedure being cancelled.      On the day of your procedure, please designate an adult(s) who can drive you home stay with you for the next 24 hours. The medicines used in the exam will make you sleepy. You will not be able to drive.      You cannot take public transportation, ride share services, or non-medical taxi service without a responsible caregiver.  Medical transport services are allowed with the requirement that a responsible caregiver will receive you at your destination.  We require that drivers and caregivers are confirmed prior to your procedure.

## 2024-03-06 ENCOUNTER — OFFICE VISIT (OUTPATIENT)
Dept: DERMATOLOGY | Facility: CLINIC | Age: 64
End: 2024-03-06
Payer: COMMERCIAL

## 2024-03-06 DIAGNOSIS — L57.8 ACTINIC SKIN DAMAGE: Primary | ICD-10-CM

## 2024-03-06 DIAGNOSIS — D22.9 MULTIPLE BENIGN NEVI: ICD-10-CM

## 2024-03-06 DIAGNOSIS — L81.4 LENTIGINES: ICD-10-CM

## 2024-03-06 PROCEDURE — 99213 OFFICE O/P EST LOW 20 MIN: CPT | Performed by: STUDENT IN AN ORGANIZED HEALTH CARE EDUCATION/TRAINING PROGRAM

## 2024-03-06 ASSESSMENT — PAIN SCALES - GENERAL: PAINLEVEL: NO PAIN (0)

## 2024-03-06 NOTE — LETTER
3/6/2024         RE: Bernabe Dykes  [address] [facility] [address]        Dear Colleague,    Thank you for referring your patient, Bernabe Dykes, to the [facility]. Please see a copy of my visit note below.    [facility] Dermatology Note    Encounter Date: Mar 6, 2024    Dermatology Problem List:  #Hx plastic nevus with severe atypia, excised April 2023  ______________________________________    Impression/Plan:  Jeremi was seen today for skin check.    Diagnoses and all orders for this visit:    Actinic skin damage\  Multiple benign nevi  Lentigines  - Reviewed the compounding benefits of incremental changes to sun protective clothing behaviors including increased frequency of sunscreen and sun protective clothing like broad brimmed hats and longsleeved UPF containing clothing        Follow-up in 1 year.       Staff Involved:  Staff Only    Jelani Barker MD   of Dermatology  Department of Dermatology  [facility]      CC:   Chief Complaint   Patient presents with     Skin Check       History of Present Illness:  Mr. Bernabe Dykes is a 63 year old male who presents as a return patient.    Patient last had a skin check March 2023 at that time a lesion from the central back was biopsied which returned as a junctional dysplastic nevus with severe atypia, complete excision was recommended and performed on April 17 which showed complete excision    Pt presents today for concerns about spots on trunk and extremities.  Is going to [place] soon.  Wears sunscreen more regularly than he did prior to the excision of the dysplastic nevus      Labs:      Physical exam:  Vitals: There were no vitals taken for this visit.  GEN: well developed, well-nourished, in no acute distress, in a pleasant mood.     SKIN: Harvey phototype 1  - Full skin, which includes the head/face, both arms, chest,  back, abdomen,both legs, genitalia and/or groin buttocks, digits and/or nails, was examined.  - Flat brown macules and patches in a sun exposed areas on face and extremities  - scattered brown papules on trunk and extremities   - No other lesions of concern on areas examined.     Past Medical History:   Past Medical History:   Diagnosis Date     DVT of lower extremity (deep venous thrombosis) (H)     2005 and 2012     HTN (hypertension)      Hyperlipidemia      IGT (impaired glucose tolerance)      Past Surgical History:   Procedure Laterality Date     BIOPSY  March 2023     COLONOSCOPY  02/17/2014    Procedure: COLONOSCOPY;;  Surgeon: Nathan Olmedo MD;  Location:  GI     ORTHOPEDIC SURGERY  01/01/1978    left knee repair     ORTHOPEDIC SURGERY      left wrist repair, metal        Social History:   reports that he has never smoked. He has never used smokeless tobacco. He reports current alcohol use. He reports that he does not use drugs.    Family History:  Family History   Problem Relation Age of Onset     Coronary Artery Disease Father 54        CABG, ICD     Obesity Father      Diabetes Father      Hypertension Father      Diabetes Mother      Obesity Mother      Hypertension Mother        Medications:  Current Outpatient Medications   Medication Sig Dispense Refill     amLODIPine (NORVASC) 5 MG tablet Take 1 tablet (5 mg) by mouth at bedtime 90 tablet 3     rivaroxaban ANTICOAGULANT (XARELTO ANTICOAGULANT) 20 MG TABS tablet Take 1 tablet (20 mg) by mouth daily 90 tablet 3     rosuvastatin (CRESTOR) 10 MG tablet Take 1 tablet (10 mg) by mouth daily 90 tablet 3     No Known Allergies              Again, thank you for allowing me to participate in the care of your patient.        Sincerely,        Jelani Barker MD

## 2024-03-06 NOTE — PROGRESS NOTES
[facility] Dermatology Note    Encounter Date: Mar 6, 2024    Dermatology Problem List:  #Hx plastic nevus with severe atypia, excised April 2023  ______________________________________    Impression/Plan:  Jeremi was seen today for skin check.    Diagnoses and all orders for this visit:    Actinic skin damage\  Multiple benign nevi  Lentigines  - Reviewed the compounding benefits of incremental changes to sun protective clothing behaviors including increased frequency of sunscreen and sun protective clothing like broad brimmed hats and longsleeved UPF containing clothing        Follow-up in 1 year.       Staff Involved:  Staff Only    Jelani Barker MD   of Dermatology  Department of Dermatology  [facility]      CC:   Chief Complaint   Patient presents with    Skin Check       History of Present Illness:  Mr. Bernabe Dykes is a 63 year old male who presents as a return patient.    Patient last had a skin check March 2023 at that time a lesion from the central back was biopsied which returned as a junctional dysplastic nevus with severe atypia, complete excision was recommended and performed on April 17 which showed complete excision    Pt presents today for concerns about spots on trunk and extremities.  Is going to [place] soon.  Wears sunscreen more regularly than he did prior to the excision of the dysplastic nevus      Labs:      Physical exam:  Vitals: There were no vitals taken for this visit.  GEN: well developed, well-nourished, in no acute distress, in a pleasant mood.     SKIN: Harvey phototype 1  - Full skin, which includes the head/face, both arms, chest, back, abdomen,both legs, genitalia and/or groin buttocks, digits and/or nails, was examined.  - Flat brown macules and patches in a sun exposed areas on face and extremities  - scattered brown papules on trunk and extremities   - No other lesions of concern on areas examined.      Past Medical History:   Past Medical History:   Diagnosis Date    DVT of lower extremity (deep venous thrombosis) (H)     2005 and 2012    HTN (hypertension)     Hyperlipidemia     IGT (impaired glucose tolerance)      Past Surgical History:   Procedure Laterality Date    BIOPSY  March 2023    COLONOSCOPY  02/17/2014    Procedure: COLONOSCOPY;;  Surgeon: Nathan Olmedo MD;  Location:  GI    ORTHOPEDIC SURGERY  01/01/1978    left knee repair    ORTHOPEDIC SURGERY      left wrist repair, metal        Social History:   reports that he has never smoked. He has never used smokeless tobacco. He reports current alcohol use. He reports that he does not use drugs.    Family History:  Family History   Problem Relation Age of Onset    Coronary Artery Disease Father 54        CABG, ICD    Obesity Father     Diabetes Father     Hypertension Father     Diabetes Mother     Obesity Mother     Hypertension Mother        Medications:  Current Outpatient Medications   Medication Sig Dispense Refill    amLODIPine (NORVASC) 5 MG tablet Take 1 tablet (5 mg) by mouth at bedtime 90 tablet 3    rivaroxaban ANTICOAGULANT (XARELTO ANTICOAGULANT) 20 MG TABS tablet Take 1 tablet (20 mg) by mouth daily 90 tablet 3    rosuvastatin (CRESTOR) 10 MG tablet Take 1 tablet (10 mg) by mouth daily 90 tablet 3     No Known Allergies

## 2024-04-15 ENCOUNTER — TELEPHONE (OUTPATIENT)
Dept: GASTROENTEROLOGY | Facility: CLINIC | Age: 64
End: 2024-04-15

## 2024-04-15 NOTE — TELEPHONE ENCOUNTER
Pre visit planning completed.      Procedure details:    Patient scheduled for Colonoscopy  on 4/29/24.     Arrival time: 1000. Procedure time 1045    Facility location: [facility] [place]; [address] Floor, [place] [address]. Check in location: 2nd Floor at Surgery desk.    Sedation type: Conscious sedation     Pre op exam needed? N/A    Indication for procedure: Special screening for malignant neoplasms, colon       Chart review:     Electronic implanted devices? No    Recent diagnosis of diverticulitis within the last 6 weeks? No    Diabetic? No      Medication review:    Anticoagulants? Yes Rivaroxaban (Xarelto): Recommended HOLD 2 days before procedure.  Consult with your managing provider.    NSAIDS? No NSAID medications per patient's medication list.  RN will verify with pre-assessment call.    Other medication HOLDING recommendations:  N/A      Prep for procedure:     Bowel prep recommendation: Standard Miralax  Due to: standard bowel prep.    Prep instructions sent via [employer]         Corinne Kliber, RN  Endoscopy Procedure Pre Assessment RN  [phone] option 4

## 2024-04-15 NOTE — TELEPHONE ENCOUNTER
Attempted to contact patient in order to complete pre assessment questions.     No answer. Left message to return call to [phone] option 4    Callback communication sent via [email or web address].    Corinne Kliber, RN  Endoscopy Procedure Pre Assessment RN  [phone] option 4

## 2024-04-16 NOTE — TELEPHONE ENCOUNTER
Pre assessment completed for upcoming procedure.   (Please see previous telephone encounter notes for complete details)    Patient  returned call.       Procedure details:    Arrival time and facility location reviewed.    Pre op exam needed? N/A    Designated  policy reviewed. Instructed to have someone stay 6  hours post procedure.       Medication review:    Medications reviewed. Please see supporting documentation below. Holding recommendations discussed (if applicable).   Blood thinner/Anti-platelet medication(s):  Rivaroxaban (Xarelto): Recommended HOLD 2 days before procedure.  Consult with your managing provider.      Prep for procedure:     Procedure prep instructions reviewed.        Any additional information needed:  N/A      Patient  verbalized understanding and had no questions or concerns at this time.      Maren Cheney RN  Endoscopy Procedure Pre Assessment RN  [phone] option 4

## 2024-04-29 ENCOUNTER — HOSPITAL ENCOUNTER (OUTPATIENT)
Facility: AMBULATORY SURGERY CENTER | Age: 64
Discharge: HOME | End: 2024-04-29
Attending: COLON & RECTAL SURGERY | Admitting: COLON & RECTAL SURGERY
Payer: COMMERCIAL

## 2024-04-29 VITALS
RESPIRATION RATE: 16 BRPM | TEMPERATURE: 97.5 F | OXYGEN SATURATION: 94 % | HEART RATE: 87 BPM | SYSTOLIC BLOOD PRESSURE: 142 MMHG | DIASTOLIC BLOOD PRESSURE: 106 MMHG

## 2024-04-29 LAB — COLONOSCOPY: NORMAL

## 2024-04-29 PROCEDURE — 45385 COLONOSCOPY W/LESION REMOVAL: CPT

## 2024-04-29 PROCEDURE — G8907 PT DOC NO EVENTS ON DISCHARG: HCPCS

## 2024-04-29 PROCEDURE — G8918 PT W/O PREOP ORDER IV AB PRO: HCPCS

## 2024-04-29 PROCEDURE — 88305 TISSUE EXAM BY PATHOLOGIST: CPT | Performed by: PATHOLOGY

## 2024-04-29 RX ORDER — NALOXONE HYDROCHLORIDE 0.4 MG/ML
0.2 INJECTION, SOLUTION INTRAMUSCULAR; INTRAVENOUS; SUBCUTANEOUS
Status: DISCONTINUED | OUTPATIENT
Start: 2024-04-29 | End: 2024-04-30 | Stop reason: HOSPADM

## 2024-04-29 RX ORDER — ONDANSETRON 2 MG/ML
4 INJECTION INTRAMUSCULAR; INTRAVENOUS
Status: DISCONTINUED | OUTPATIENT
Start: 2024-04-29 | End: 2024-04-30 | Stop reason: HOSPADM

## 2024-04-29 RX ORDER — PROCHLORPERAZINE MALEATE 10 MG
10 TABLET ORAL EVERY 6 HOURS PRN
Status: DISCONTINUED | OUTPATIENT
Start: 2024-04-29 | End: 2024-04-30 | Stop reason: HOSPADM

## 2024-04-29 RX ORDER — FENTANYL CITRATE 50 UG/ML
INJECTION, SOLUTION INTRAMUSCULAR; INTRAVENOUS PRN
Status: DISCONTINUED | OUTPATIENT
Start: 2024-04-29 | End: 2024-04-29 | Stop reason: HOSPADM

## 2024-04-29 RX ORDER — NALOXONE HYDROCHLORIDE 0.4 MG/ML
0.4 INJECTION, SOLUTION INTRAMUSCULAR; INTRAVENOUS; SUBCUTANEOUS
Status: DISCONTINUED | OUTPATIENT
Start: 2024-04-29 | End: 2024-04-30 | Stop reason: HOSPADM

## 2024-04-29 RX ORDER — FLUMAZENIL 0.1 MG/ML
0.2 INJECTION, SOLUTION INTRAVENOUS
Status: ACTIVE | OUTPATIENT
Start: 2024-04-29 | End: 2024-04-29

## 2024-04-29 RX ORDER — LIDOCAINE 40 MG/G
CREAM TOPICAL
Status: DISCONTINUED | OUTPATIENT
Start: 2024-04-29 | End: 2024-04-30 | Stop reason: HOSPADM

## 2024-04-29 RX ORDER — ONDANSETRON 4 MG/1
4 TABLET, ORALLY DISINTEGRATING ORAL EVERY 6 HOURS PRN
Status: DISCONTINUED | OUTPATIENT
Start: 2024-04-29 | End: 2024-04-30 | Stop reason: HOSPADM

## 2024-04-29 RX ORDER — ONDANSETRON 2 MG/ML
4 INJECTION INTRAMUSCULAR; INTRAVENOUS EVERY 6 HOURS PRN
Status: DISCONTINUED | OUTPATIENT
Start: 2024-04-29 | End: 2024-04-30 | Stop reason: HOSPADM

## 2024-04-29 NOTE — H&P
Pre-Endoscopy History and Physical     Bernabe Dykes MRN# [id number]   YOB: 1960 Age: 63 year old     Date of Procedure: 04/29/24  Primary care provider: Josue Todd  Type of Endoscopy: Colonoscopy  Reason for Procedure: screening  Type of Anesthesia Anticipated: Moderate Sedation    HPI:    Bernabe is a 63 year old male who will be undergoing the above procedure.      Prior colonoscopy: 2014    A history and physical has been performed, notable for history of blood clots. The patient's medications and allergies have been reviewed. The risks and benefits of the procedure and the sedation options and risks were discussed with the patient.  All questions were answered and informed consent was obtained.  He stopped his anticoagulation Friday.    He denies a personal or family history of anesthesia complications or bleeding disorders. No family history of CRC or IBD.    No Known Allergies   Allergy to Latex: NO   Allergy to tape: NO   Intolerances: NO     Current Outpatient Medications   Medication Sig Dispense Refill    amLODIPine (NORVASC) 5 MG tablet Take 1 tablet (5 mg) by mouth at bedtime 90 tablet 3    rosuvastatin (CRESTOR) 10 MG tablet Take 1 tablet (10 mg) by mouth daily 90 tablet 3    rivaroxaban ANTICOAGULANT (XARELTO ANTICOAGULANT) 20 MG TABS tablet Take 1 tablet (20 mg) by mouth daily 90 tablet 3     Current Facility-Administered Medications   Medication Dose Route Frequency Provider Last Rate Last Admin    lidocaine (LMX4) kit   Topical Q1H PRTena Mckeon MD        lidocaine 1 % 0.1-1 mL  0.1-1 mL Other Q1H PRTena Mcekon MD        ondansetron (ZOFRAN) injection 4 mg  4 mg Intravenous Once PRN Tena Bernstein MD        sodium chloride (PF) 0.9% PF flush 3 mL  3 mL Intracatheter Q8H Tena Bernstein MD        sodium chloride (PF) 0.9% PF flush 3 mL  3 mL Intracatheter q1 min prTena Mckeon MD           Patient Active Problem List  "  Diagnosis    Skin exam, screening for cancer    Elevated bilirubin    IGT (impaired glucose tolerance)    Other hyperlipidemia    CARDIOVASCULAR SCREENING; LDL GOAL LESS THAN 130    Screening for prostate cancer    Primary hypertension        Past Medical History:   Diagnosis Date    DVT of lower extremity (deep venous thrombosis) (H)     2005 and 2012    HTN (hypertension)     Hyperlipidemia     IGT (impaired glucose tolerance)         Past Surgical History:   Procedure Laterality Date    BIOPSY  March 2023    COLONOSCOPY  02/17/2014    Procedure: COLONOSCOPY;;  Surgeon: Nathan Olmedo MD;  Location:  GI    ORTHOPEDIC SURGERY  01/01/1978    left knee repair    ORTHOPEDIC SURGERY      left wrist repair, metal        Social History     Tobacco Use    Smoking status: Never    Smokeless tobacco: Never   Substance Use Topics    Alcohol use: Yes     Comment: 10 glass of wine a week       Family History   Problem Relation Age of Onset    Coronary Artery Disease Father 54        CABG, ICD    Obesity Father     Diabetes Father     Hypertension Father     Diabetes Mother     Obesity Mother     Hypertension Mother        REVIEW OF SYSTEMS:     5 point ROS negative except as noted above in HPI, including Gen., Resp., CV, GI &  system review.      PHYSICAL EXAM:   BP (!) 140/98   Pulse 86   Temp 97.5  F (36.4  C) (Temporal)   Resp 16   SpO2 96%  Estimated body mass index is 31.23 kg/m  as calculated from the following:    Height as of 2/7/24: 1.759 m (5' 9.25\").    Weight as of 2/7/24: 96.6 kg (213 lb).   All Vitals have been reviewed.    GENERAL APPEARANCE: healthy and alert  MENTAL STATUS: alert  AIRWAY EXAM: Mallampatti Class II (visualization of the soft palate, fauces, and uvula)  RESP: lungs clear to auscultation - no rales, rhonchi or wheezes  CV: regular rates and rhythm      IMPRESSION   ASA Class 2 - Mild systemic disease        PLAN:     Plan for colonoscopy. We discussed the risks, benefits and alternatives " and the patient wished to proceed.    The above has been forwarded to the consulting provider.    Tena Bernstein MD, MS, FACS, [facility]  [facility]  Office: [phone]  4/29/2024 10:03 AM

## 2024-04-30 LAB
PATH REPORT.COMMENTS IMP SPEC: NORMAL
PATH REPORT.COMMENTS IMP SPEC: NORMAL
PATH REPORT.FINAL DX SPEC: NORMAL
PATH REPORT.GROSS SPEC: NORMAL
PATH REPORT.MICROSCOPIC SPEC OTHER STN: NORMAL
PATH REPORT.RELEVANT HX SPEC: NORMAL
PHOTO IMAGE: NORMAL

## 2024-05-13 ENCOUNTER — PATIENT OUTREACH (OUTPATIENT)
Dept: GASTROENTEROLOGY | Facility: CLINIC | Age: 64
End: 2024-05-13
Payer: COMMERCIAL

## 2024-10-07 ENCOUNTER — MYC MEDICAL ADVICE (OUTPATIENT)
Dept: INTERNAL MEDICINE | Facility: CLINIC | Age: 64
End: 2024-10-07
Payer: COMMERCIAL

## 2024-10-07 DIAGNOSIS — I82.403 RECURRENT DEEP VEIN THROMBOSIS (DVT) OF BOTH LOWER EXTREMITIES (H): Primary | ICD-10-CM

## 2024-10-09 ENCOUNTER — DOCUMENTATION ONLY (OUTPATIENT)
Dept: ANTICOAGULATION | Facility: CLINIC | Age: 64
End: 2024-10-09
Payer: COMMERCIAL

## 2024-10-09 NOTE — PROGRESS NOTES
Anticoagulant Therapeutic Duplication    Duplicate orders identified: different medication of the same therapeutic class    The duplicate anticoagulant order(s) has been discontinued    Active anticoagulant: apixaban (Eliquis)    Plan made per [facility] anticoagulation protocol.    Lory Kendrick RN  10/9/2024

## 2024-11-11 ENCOUNTER — OFFICE VISIT (OUTPATIENT)
Dept: INTERNAL MEDICINE | Facility: CLINIC | Age: 64
End: 2024-11-11
Payer: COMMERCIAL

## 2024-11-11 VITALS
WEIGHT: 218.9 LBS | SYSTOLIC BLOOD PRESSURE: 148 MMHG | HEART RATE: 76 BPM | DIASTOLIC BLOOD PRESSURE: 103 MMHG | BODY MASS INDEX: 32.42 KG/M2 | HEIGHT: 69 IN | OXYGEN SATURATION: 98 % | RESPIRATION RATE: 18 BRPM | TEMPERATURE: 98.3 F

## 2024-11-11 DIAGNOSIS — R73.02 IGT (IMPAIRED GLUCOSE TOLERANCE): ICD-10-CM

## 2024-11-11 DIAGNOSIS — Z82.49 FAMILY HISTORY OF ISCHEMIC HEART DISEASE: ICD-10-CM

## 2024-11-11 DIAGNOSIS — E78.49 OTHER HYPERLIPIDEMIA: ICD-10-CM

## 2024-11-11 DIAGNOSIS — I82.403 RECURRENT DEEP VEIN THROMBOSIS (DVT) OF BOTH LOWER EXTREMITIES (H): Primary | ICD-10-CM

## 2024-11-11 DIAGNOSIS — I10 PRIMARY HYPERTENSION: ICD-10-CM

## 2024-11-11 DIAGNOSIS — M79.89 LEG SWELLING: ICD-10-CM

## 2024-11-11 PROCEDURE — 99214 OFFICE O/P EST MOD 30 MIN: CPT | Performed by: INTERNAL MEDICINE

## 2024-11-12 ENCOUNTER — ANCILLARY PROCEDURE (OUTPATIENT)
Dept: ULTRASOUND IMAGING | Facility: CLINIC | Age: 64
End: 2024-11-12
Attending: INTERNAL MEDICINE
Payer: COMMERCIAL

## 2024-11-12 DIAGNOSIS — M79.89 LEG SWELLING: ICD-10-CM

## 2024-11-12 DIAGNOSIS — I82.403 RECURRENT DEEP VEIN THROMBOSIS (DVT) OF BOTH LOWER EXTREMITIES (H): ICD-10-CM

## 2024-11-12 PROCEDURE — 93971 EXTREMITY STUDY: CPT | Mod: LT | Performed by: STUDENT IN AN ORGANIZED HEALTH CARE EDUCATION/TRAINING PROGRAM

## 2024-11-12 NOTE — PROGRESS NOTES
Jeremi is a 64 year old that presents in clinic today for the following:     Chief Complaint   Patient presents with    Swelling     Swelling in legs and ankles           11/11/2024     5:59 PM   Additional Questions   Roomed by KTR     Screenings from encounters over the past 10 days    No data recorded       Samuel Briggs, EMT at 6:02 PM on 11/11/2024    Answers submitted by the patient for this visit:  General Questionnaire (Submitted on 11/11/2024)  Chief Complaint: Chronic problems general questions HPI Form  How many days per week do you miss taking your medication?: 0  General Concern (Submitted on 11/11/2024)  Chief Complaint: Chronic problems general questions HPI Form  What is the reason for your visit today?: leg pain  When did your symptoms begin?: 3-7 days ago  What are your symptoms?: redness swelling discomfort  How would you describe these symptoms?: Moderate  Are your symptoms:: Staying the same  Have you had these symptoms before?: No  Questionnaire about: Chronic problems general questions HPI Form (Submitted on 11/11/2024)  Chief Complaint: Chronic problems general questions HPI Form

## 2024-11-12 NOTE — PROGRESS NOTES
HPI  64-year-old was at a trade show in [place] last week.  He spent most of the week on his feet standing and he noted swelling in the legs left greater than right.  This was not associated with any injury or trauma.  He had a little bit of tenderness anteriorly along the medial shin.  This was associated with some redness.  He noted improvement with icing and with elevation.  He feels this is different than what he is experienced with his prior DVTs.  He is switched apixaban a month ago has been tolerating this well and has been taking it regularly and has not missed any doses at all recently.  Otherwise he has had no dyspnea no fever chills or sweats.  His wife applied some topical lotion and he felt that this provided some relief and improvement to the situation.  He follows a salt restricted diet.  He also indicated that his brother recently  from a combination of valvular heart disease and three-vessel coronary disease at age 59.  He will be scheduled for annual visit in a couple of months and we will in addition to the regular labs checked his hs-CRP LP(a) and homocystine levels in light of the family history of premature heart disease  Past Medical History:   Diagnosis Date    DVT of lower extremity (deep venous thrombosis) (H)      and     HTN (hypertension)     Hyperlipidemia     IGT (impaired glucose tolerance)      Past Surgical History:   Procedure Laterality Date    BIOPSY  2023    COLONOSCOPY  2014    Procedure: COLONOSCOPY;;  Surgeon: Nathan Olmedo MD;  Location:  GI    COLONOSCOPY N/A 2024    Procedure: COLONOSCOPY, FLEXIBLE, WITH LESION REMOVAL USING SNARE;  Surgeon: Tena Bernstein MD;  Location:  OR    COLONOSCOPY WITH CO2 INSUFFLATION N/A 2024    Procedure: Colonoscopy with CO2 insufflation;  Surgeon: Tena Bernstein MD;  Location:  OR    ORTHOPEDIC SURGERY  1978    left knee repair    ORTHOPEDIC SURGERY      left wrist repair, metal   "    Family History   Problem Relation Age of Onset    Coronary Artery Disease Father 54        CABG, ICD    Obesity Father     Diabetes Father     Hypertension Father     Diabetes Mother     Obesity Mother     Hypertension Mother          Exam:  BP (!) 148/103 (BP Location: Right arm, Patient Position: Sitting, Cuff Size: Adult Large)   Pulse 76   Temp 98.3  F (36.8  C) (Oral)   Resp 18   Ht 1.759 m (5' 9.25\")   Wt 99.3 kg (218 lb 14.4 oz)   SpO2 98%   BMI 32.09 kg/m    218 lbs 14.4 oz  The patient is alert, oriented with a clear sensorium.   Skin shows no lesions or rashes and good turgor.   Head is normocephalic and atraumatic.     Extremities show no bilateral lower leg edema left greater than right with superficial venous varicosities on the left associated with some tenderness.  This is not associated with any cords or erythema is Homans' sign is negative.  He has slight warmth and minimal erythema in association with this..      ASSESSMENT  1 recurrent DVT on apixaban with apparent venous insufficiency.    2 Hypertension likely aggravated by above  3 hyperlipidemia needs rosuvastatin  4 IGT  5 Gilbert's syndrome     Plan  Switch to heat use topical diclofenac over the tender areas and we will get a stat ultrasound of the leg.  Encouraged him to use compression stockings or Ace wrap to help with the swelling and continue to avoid salt.  Labs ordered for his upcoming annual visit    This note was completed using Dragon voice recognition software.      Josue Todd MD  General Internal Medicine  [facility]  [phone]        "

## 2024-12-02 ENCOUNTER — OFFICE VISIT (OUTPATIENT)
Dept: INTERNAL MEDICINE | Facility: CLINIC | Age: 64
End: 2024-12-02
Payer: COMMERCIAL

## 2024-12-02 VITALS
TEMPERATURE: 97.5 F | RESPIRATION RATE: 16 BRPM | OXYGEN SATURATION: 97 % | DIASTOLIC BLOOD PRESSURE: 85 MMHG | HEIGHT: 69 IN | BODY MASS INDEX: 32.84 KG/M2 | SYSTOLIC BLOOD PRESSURE: 148 MMHG | HEART RATE: 58 BPM | WEIGHT: 221.7 LBS

## 2024-12-02 DIAGNOSIS — R60.0 PERIPHERAL EDEMA: Primary | ICD-10-CM

## 2024-12-02 DIAGNOSIS — I10 PRIMARY HYPERTENSION: ICD-10-CM

## 2024-12-02 RX ORDER — HYDROCHLOROTHIAZIDE 25 MG/1
25 TABLET ORAL DAILY
Qty: 30 TABLET | Refills: 1 | Status: SHIPPED | OUTPATIENT
Start: 2024-12-02

## 2024-12-02 NOTE — PATIENT INSTRUCTIONS
Check your blood pressure a couple of times per week and bring the numbers to your appointment with Dr. Todd next month.

## 2024-12-02 NOTE — PROGRESS NOTES
Assessment & Plan     Peripheral edema  and  Primary hypertension    Jeremi presents with ongoing left leg swelling after a trip to [place] with U/S last month negative for DVT.  He has not had any dyspnea to suggest a cardiac cause.  No sign of infection on exam.  Symptoms are likely venous - he does have a history of DVT with one on the left in 2020. He is on Eliquis for DVT prophylaxis.  He has been on amlodipine for HTN for a couple of years, and we discussed this can cause edema, but he had not trouble with edema prior to last month, so not clear that this would be contributing now.  He just got compression stockings yesterday, so advised starting these.  His BP has been persistently elevated, so I suggested adding hydrochlorothiazide to see if this would help with both BP and edema.  He will monitor BPs at home.  He has labs and an appointment with his PCP next month, so he'll follow-up then and contact us if having worsening edema or side effects in the meantime.  If not improving, could consider a trial off amlodipine.      - hydrochlorothiazide (HYDRODIURIL) 25 MG tablet; Take 1 tablet (25 mg) by mouth daily.      Subjective   Jeremi is a 64 year old, presenting for the following health issues:  Follow Up (Lower leg not improving; left lower leg swelling, US came back negative for blood clot )    History of Present Illness       Reason for visit:  Leg swelling    He eats 4 or more servings of fruits and vegetables daily.He consumes 0 sweetened beverage(s) daily.He exercises with enough effort to increase his heart rate 20 to 29 minutes per day.  He exercises with enough effort to increase his heart rate 3 or less days per week.   He is taking medications regularly.       Jeremi was seen in clinic by Dr. Todd on 11/11 for left greater than right leg swelling after going to a trade show in [place].  He has a history of prior DVTs on Eliquis.  U/S was done that showed no DVT and symptoms were attributed to  "venous insufficiency.  He was advised to use heat, topical diclofenac, and compression.    Today, Jeremi reports no improvement in his swelling, worse on the left.  Not having pain.  Has some itching in the lower leg.  He has been using heat and ice and the latter seemed more effective.  Not using the diclofenac much since no pain.  He just got new compression socks - arrived last night.      He has been on amlodipine for awhile without prior swelling issues.  He has not been on other BP meds in the past.     Xarelto was changed to Eliquis a couple of weeks prior.      BP is high in clinic and he is not monitoring at home but does have a BP cuff at home.     BP Readings from Last 6 Encounters:   12/02/24 (!) 148/85   11/11/24 (!) 148/103   04/29/24 (!) 142/106   02/07/24 135/87   03/22/23 136/88   01/19/23 (!) 150/87           Constitutional, pulmonary and MSK systems are negative, except as otherwise noted.       Objective    BP (!) 148/85 (BP Location: Right arm, Patient Position: Sitting, Cuff Size: Adult Large)   Pulse 58   Temp 97.5  F (36.4  C)   Resp 16   Ht 1.753 m (5' 9\")   Wt 100.6 kg (221 lb 11.2 oz)   SpO2 97%   BMI 32.74 kg/m    Body mass index is 32.74 kg/m .  Physical Exam   GENERAL: alert and no distress  MS:  moderate pitting edema of lower left leg, no redness or warmth          Signed Electronically by: Amandeep Venegas MD  "

## 2025-01-16 ENCOUNTER — LAB (OUTPATIENT)
Dept: LAB | Facility: CLINIC | Age: 65
End: 2025-01-16
Payer: COMMERCIAL

## 2025-01-16 DIAGNOSIS — I10 PRIMARY HYPERTENSION: ICD-10-CM

## 2025-01-16 DIAGNOSIS — M79.89 LEG SWELLING: ICD-10-CM

## 2025-01-16 DIAGNOSIS — R73.02 IGT (IMPAIRED GLUCOSE TOLERANCE): ICD-10-CM

## 2025-01-16 DIAGNOSIS — E78.49 OTHER HYPERLIPIDEMIA: ICD-10-CM

## 2025-01-16 DIAGNOSIS — I82.403 RECURRENT DEEP VEIN THROMBOSIS (DVT) OF BOTH LOWER EXTREMITIES (H): ICD-10-CM

## 2025-01-16 DIAGNOSIS — Z82.49 FAMILY HISTORY OF ISCHEMIC HEART DISEASE: ICD-10-CM

## 2025-01-16 LAB
ALBUMIN SERPL BCG-MCNC: 4.5 G/DL (ref 3.5–5.2)
ALP SERPL-CCNC: 56 U/L (ref 40–150)
ALT SERPL W P-5'-P-CCNC: 49 U/L (ref 0–70)
ANION GAP SERPL CALCULATED.3IONS-SCNC: 10 MMOL/L (ref 7–15)
APO A-I SERPL-MCNC: 8 MG/DL
AST SERPL W P-5'-P-CCNC: 36 U/L (ref 0–45)
BASOPHILS # BLD AUTO: 0.1 10E3/UL (ref 0–0.2)
BASOPHILS NFR BLD AUTO: 1 %
BILIRUB SERPL-MCNC: 1.4 MG/DL
BUN SERPL-MCNC: 17 MG/DL (ref 8–23)
CALCIUM SERPL-MCNC: 9.4 MG/DL (ref 8.8–10.4)
CHLORIDE SERPL-SCNC: 103 MMOL/L (ref 98–107)
CHOLEST SERPL-MCNC: 132 MG/DL
CREAT SERPL-MCNC: 1.12 MG/DL (ref 0.67–1.17)
CRP SERPL HS-MCNC: 1.13 MG/L
EGFRCR SERPLBLD CKD-EPI 2021: 73 ML/MIN/1.73M2
EOSINOPHIL # BLD AUTO: 0.1 10E3/UL (ref 0–0.7)
EOSINOPHIL NFR BLD AUTO: 2 %
ERYTHROCYTE [DISTWIDTH] IN BLOOD BY AUTOMATED COUNT: 12.3 % (ref 10–15)
EST. AVERAGE GLUCOSE BLD GHB EST-MCNC: 108 MG/DL
FASTING STATUS PATIENT QL REPORTED: YES
FASTING STATUS PATIENT QL REPORTED: YES
GLUCOSE SERPL-MCNC: 116 MG/DL (ref 70–99)
HBA1C MFR BLD: 5.4 %
HCO3 SERPL-SCNC: 27 MMOL/L (ref 22–29)
HCT VFR BLD AUTO: 48.1 % (ref 40–53)
HCYS SERPL-SCNC: 12.9 UMOL/L (ref 0–15)
HDLC SERPL-MCNC: 49 MG/DL
HGB BLD-MCNC: 16.7 G/DL (ref 13.3–17.7)
IMM GRANULOCYTES # BLD: 0 10E3/UL
IMM GRANULOCYTES NFR BLD: 0 %
LDLC SERPL CALC-MCNC: 68 MG/DL
LYMPHOCYTES # BLD AUTO: 2.5 10E3/UL (ref 0.8–5.3)
LYMPHOCYTES NFR BLD AUTO: 39 %
MCH RBC QN AUTO: 30.2 PG (ref 26.5–33)
MCHC RBC AUTO-ENTMCNC: 34.7 G/DL (ref 31.5–36.5)
MCV RBC AUTO: 87 FL (ref 78–100)
MONOCYTES # BLD AUTO: 0.8 10E3/UL (ref 0–1.3)
MONOCYTES NFR BLD AUTO: 12 %
NEUTROPHILS # BLD AUTO: 3 10E3/UL (ref 1.6–8.3)
NEUTROPHILS NFR BLD AUTO: 46 %
NONHDLC SERPL-MCNC: 83 MG/DL
NRBC # BLD AUTO: 0 10E3/UL
NRBC BLD AUTO-RTO: 0 /100
PLATELET # BLD AUTO: 298 10E3/UL (ref 150–450)
POTASSIUM SERPL-SCNC: 4 MMOL/L (ref 3.4–5.3)
PROT SERPL-MCNC: 7.4 G/DL (ref 6.4–8.3)
RBC # BLD AUTO: 5.53 10E6/UL (ref 4.4–5.9)
SODIUM SERPL-SCNC: 140 MMOL/L (ref 135–145)
TRIGL SERPL-MCNC: 75 MG/DL
TSH SERPL DL<=0.005 MIU/L-ACNC: 2.2 UIU/ML (ref 0.3–4.2)
WBC # BLD AUTO: 6.4 10E3/UL (ref 4–11)

## 2025-01-16 PROCEDURE — 83036 HEMOGLOBIN GLYCOSYLATED A1C: CPT | Performed by: INTERNAL MEDICINE

## 2025-01-16 PROCEDURE — 36415 COLL VENOUS BLD VENIPUNCTURE: CPT | Performed by: PATHOLOGY

## 2025-01-16 PROCEDURE — 99000 SPECIMEN HANDLING OFFICE-LAB: CPT | Performed by: PATHOLOGY

## 2025-01-16 PROCEDURE — 80053 COMPREHEN METABOLIC PANEL: CPT | Performed by: PATHOLOGY

## 2025-01-16 PROCEDURE — 84443 ASSAY THYROID STIM HORMONE: CPT | Performed by: PATHOLOGY

## 2025-01-16 PROCEDURE — 83090 ASSAY OF HOMOCYSTEINE: CPT | Performed by: INTERNAL MEDICINE

## 2025-01-16 PROCEDURE — 86141 C-REACTIVE PROTEIN HS: CPT | Performed by: INTERNAL MEDICINE

## 2025-01-16 PROCEDURE — 83695 ASSAY OF LIPOPROTEIN(A): CPT | Performed by: INTERNAL MEDICINE

## 2025-01-16 PROCEDURE — 80061 LIPID PANEL: CPT | Performed by: PATHOLOGY

## 2025-01-16 PROCEDURE — 85025 COMPLETE CBC W/AUTO DIFF WBC: CPT | Performed by: PATHOLOGY

## 2025-01-17 SDOH — HEALTH STABILITY: PHYSICAL HEALTH: ON AVERAGE, HOW MANY MINUTES DO YOU ENGAGE IN EXERCISE AT THIS LEVEL?: 30 MIN

## 2025-01-17 SDOH — HEALTH STABILITY: PHYSICAL HEALTH: ON AVERAGE, HOW MANY DAYS PER WEEK DO YOU ENGAGE IN MODERATE TO STRENUOUS EXERCISE (LIKE A BRISK WALK)?: 4 DAYS

## 2025-01-22 ENCOUNTER — OFFICE VISIT (OUTPATIENT)
Dept: INTERNAL MEDICINE | Facility: CLINIC | Age: 65
End: 2025-01-22
Payer: COMMERCIAL

## 2025-01-22 VITALS
DIASTOLIC BLOOD PRESSURE: 94 MMHG | HEIGHT: 69 IN | HEART RATE: 54 BPM | OXYGEN SATURATION: 97 % | RESPIRATION RATE: 16 BRPM | SYSTOLIC BLOOD PRESSURE: 143 MMHG | WEIGHT: 213.6 LBS | BODY MASS INDEX: 31.64 KG/M2

## 2025-01-22 DIAGNOSIS — I10 PRIMARY HYPERTENSION: ICD-10-CM

## 2025-01-22 DIAGNOSIS — R60.0 PERIPHERAL EDEMA: ICD-10-CM

## 2025-01-22 DIAGNOSIS — E78.49 OTHER HYPERLIPIDEMIA: ICD-10-CM

## 2025-01-22 DIAGNOSIS — Z78.9 H/O FOREIGN TRAVEL: Primary | ICD-10-CM

## 2025-01-22 PROCEDURE — 90471 IMMUNIZATION ADMIN: CPT | Performed by: INTERNAL MEDICINE

## 2025-01-22 PROCEDURE — 90677 PCV20 VACCINE IM: CPT | Performed by: INTERNAL MEDICINE

## 2025-01-22 PROCEDURE — 99396 PREV VISIT EST AGE 40-64: CPT | Mod: 25 | Performed by: INTERNAL MEDICINE

## 2025-01-22 RX ORDER — ATOVAQUONE AND PROGUANIL HYDROCHLORIDE 250; 100 MG/1; MG/1
TABLET, FILM COATED ORAL
Qty: 24 TABLET | Refills: 0 | Status: SHIPPED | OUTPATIENT
Start: 2025-01-22

## 2025-01-22 RX ORDER — ROSUVASTATIN CALCIUM 10 MG/1
10 TABLET, COATED ORAL DAILY
Qty: 90 TABLET | Refills: 3 | Status: SHIPPED | OUTPATIENT
Start: 2025-01-22

## 2025-01-22 RX ORDER — AMLODIPINE BESYLATE 5 MG/1
5 TABLET ORAL AT BEDTIME
Qty: 90 TABLET | Refills: 3 | Status: SHIPPED | OUTPATIENT
Start: 2025-01-22

## 2025-01-22 RX ORDER — HYDROCHLOROTHIAZIDE 25 MG/1
25 TABLET ORAL DAILY
Qty: 30 TABLET | Refills: 1 | Status: SHIPPED | OUTPATIENT
Start: 2025-01-22

## 2025-01-22 NOTE — PROGRESS NOTES
HPI  64-year presents today for physical examination with several issues.  He has been experiencing some left shoulder pain.  He developed this about 6 months ago he seemed to think it was precipitated by doing pec flies.  He has laid off of those but he still has pain and discomfort in the left shoulder worse when he lays on it at night.  He has not noted any particular movements or activities that are problematic however.  He is also planning upcoming trip to [facility] he will be in [facility] for several days we reviewed the [facility] website and he will need malaria prophylaxis.  No other vaccines appear to be indicated.  He is doing well on the present medications.  He has not been checking his blood pressure at home we discussed nonpharmacologic blood pressure control measures.  The swelling in the feet and legs that he is experiencing last fall has resolved with the hydrochlorothiazide.  We reviewed his recent labs including his cardiovascular risk issues related to his brothers premature death and they are excellent with significant improvement in his lipids on the rosuvastatin.  Past Medical History:   Diagnosis Date    DVT of lower extremity (deep venous thrombosis) (H)     2005 and 2012    HTN (hypertension)     Hyperlipidemia     IGT (impaired glucose tolerance)      Past Surgical History:   Procedure Laterality Date    BIOPSY  March 2023    COLONOSCOPY  02/17/2014    Procedure: COLONOSCOPY;;  Surgeon: Nathan Olmedo MD;  Location:  GI    COLONOSCOPY N/A 4/29/2024    Procedure: COLONOSCOPY, FLEXIBLE, WITH LESION REMOVAL USING SNARE;  Surgeon: Tena Bernstein MD;  Location:  OR    COLONOSCOPY WITH CO2 INSUFFLATION N/A 4/29/2024    Procedure: Colonoscopy with CO2 insufflation;  Surgeon: Tena Bernstein MD;  Location:  OR    ORTHOPEDIC SURGERY  01/01/1978    left knee repair    ORTHOPEDIC SURGERY      left wrist repair, metal      Family History   Problem Relation Age of Onset    Coronary Artery Disease  "Father 54        CABG, ICD    Obesity Father     Diabetes Father     Hypertension Father     Diabetes Mother     Obesity Mother     Hypertension Mother     Coronary Artery Disease Brother         Passed away from heart conditions         Exam:  BP (!) 143/94 (BP Location: Right arm, Patient Position: Sitting, Cuff Size: Adult Regular)   Pulse 54   Resp 16   Ht 1.753 m (5' 9.02\")   Wt 96.9 kg (213 lb 9.6 oz)   SpO2 97%   BMI 31.53 kg/m    213 lbs 9.6 oz  Physical Exam   The patient is alert, oriented with a clear sensorium.   Skin shows no lesions or rashes and good turgor.   Head is normocephalic and atraumatic.   Eyes show PERRLA with benign optic fundi.   Ears show normal TMs bilaterally.   Mouth shows clear oral mucosa.   Neck shows no nodes, thyromegaly or bruits.   Back is non tender.  Lungs are clear to percussion and auscultation.   Heart shows normal S1 and S2 without murmur or gallop.   Abdomen is soft and nontender without masses or organomegaly.   Genitalia show normal testes. No evidence of inguinal hernia.  Rectal shows moderate smooth prostate without nodules or masses.  Extremities show no edema and no evidence of active synovitis, weakness in the supraspinatus and positive drop arm test on left negative impingement signs  Neurologic examination shows cranial nerves II-XII intact. Motor shows 5/5 strength. Reflexes are symmetric. Cerebellar testing shows normal tandem gait.  Romberg negative.      ASSESSMENT  1 Hypertension needs F/U  2 IGT  3 hyperlipidemia improved on rosuvastatin  4 recurrent DVT on apixaban  5 Gilbert's syndrome   6 Left rotator strain    Plan  Will update his immunizations with a Prevnar I reviewed a strengthening stretching program for him with the rotator cuff.  Will plan to have him follow-up for reassessment in a year we also gave him Malarone to take prophylactically for his trip to [place]    This note was completed using Dragon voice recognition software.      Josue REILLY" MD [place]  General Internal Medicine  [facility]  [phone]

## 2025-01-22 NOTE — PROGRESS NOTES
Jeremi is a 64 year old that presents in clinic today for the following:     Chief Complaint   Patient presents with    Physical    Hypertension    Shoulder Pain           1/22/2025     2:39 PM   Additional Questions   Roomed by Catherine STAUFFER   Accompanied by N/A     Screenings as of today     PHQ-2 Total Score (Adult) - Positive if 3 or more points; Administer   PHQ-9 if positive  0    Fallen 2 or more times in the past year?  No        Catherine Elkins at 2:46 PM on 1/22/2025

## 2025-02-23 DIAGNOSIS — R60.0 PERIPHERAL EDEMA: ICD-10-CM

## 2025-02-23 DIAGNOSIS — I10 PRIMARY HYPERTENSION: ICD-10-CM

## 2025-03-01 NOTE — TELEPHONE ENCOUNTER
Last Written Prescription:  1/22/25  30 : 1  ----------------------  Last Visit Date: 1/22/25  Future Visit Date: none  ----------------------    Refill decision: Medication unable to be refilled by RN due to: Other: Last refill limited #   Is follow up needed?    Request from pharmacy:  Requested Prescriptions   Pending Prescriptions Disp Refills    hydrochlorothiazide (HYDRODIURIL) 25 MG tablet [Pharmacy Med Name: HYDROCHLOROTHIAZIDE 25MG TABLETS] 90 tablet      Sig: TAKE 1 TABLET(25 MG) BY MOUTH DAILY       Diuretics (Including Combos) Protocol Failed - 3/1/2025  3:35 PM        Failed - Most recent blood pressure under 140/90 in past 12 months     BP Readings from Last 3 Encounters:   01/22/25 (!) 143/94   12/02/24 (!) 148/85   11/11/24 (!) 148/103       No data recorded            Passed - Potassium level on file in past 12 months        Passed - Medication is active on med list and the sig matches. RN to manually verify dose and sig if red X/fail.     If the protocol passes (green check), you do not need to verify med dose and sig.    A prescription matches if they are the same clinical intention.    For Example: once daily and every morning are the same.    For all fails (red x), verify dose and sig.    If the refill does match what is on file, the RN can still proceed to approve the refill request.     If they do not match, route to the appropriate provider.             Passed - Medication indicated for associated diagnosis     Medication is associated with one or more of the following diagnoses:     Edema   Hypertension   Heart Failure   Meniere's Disease   Bilateral localized swelling of lower limbs   Pulmonary Hypertension          Passed - Has GFR on file in past 12 months and most recent value is normal        Passed - Recent (12 mo) or future (90 days) visit within the authorizing provider's specialty     The patient must have completed an in-person or virtual visit within the past 12 months or has a  future visit scheduled within the next 90 days with the authorizing provider s specialty.  Urgent care and e-visits do not qualify as an office visit for this protocol.          Passed - Patient is age 18 or older

## 2025-03-03 RX ORDER — HYDROCHLOROTHIAZIDE 25 MG/1
25 TABLET ORAL DAILY
Qty: 90 TABLET | Refills: 3 | Status: SHIPPED | OUTPATIENT
Start: 2025-03-03

## 2025-03-24 ENCOUNTER — OFFICE VISIT (OUTPATIENT)
Dept: DERMATOLOGY | Facility: CLINIC | Age: 65
End: 2025-03-24
Payer: COMMERCIAL

## 2025-03-24 DIAGNOSIS — L81.4 LENTIGINES: Primary | ICD-10-CM

## 2025-03-24 DIAGNOSIS — L82.1 SEBORRHEIC KERATOSES: ICD-10-CM

## 2025-03-24 DIAGNOSIS — L57.8 ACTINIC SKIN DAMAGE: ICD-10-CM

## 2025-03-24 PROCEDURE — 99213 OFFICE O/P EST LOW 20 MIN: CPT | Performed by: STUDENT IN AN ORGANIZED HEALTH CARE EDUCATION/TRAINING PROGRAM

## 2025-03-24 NOTE — PROGRESS NOTES
[facility] Dermatology Note    Encounter Date: Mar 24, 2025    Dermatology Problem List:  #Hx dysplastic nevus with severe atypia, excised April 2023     Major PMHx  -   ______________________________________    Impression/Plan:  Jeremi was seen today for skin check.    Diagnoses and all orders for this visit:    Lentigines  Actinic skin damage  Seborrheic keratoses  - Reviewed the compounding benefits of incremental changes to sun protective behaviors including increased frequency of sunscreen and sun protective clothing like broad brimmed hats and longsleeved UPF containing clothing        Follow-up in 1 year.       Staff Involved:  Staff Only    Jelani Barker MD   of Dermatology  Department of Dermatology  [facility]      CC:   Chief Complaint   [place] presents with    Skin Check     [facility] [place]       History of Present Illness:  Mr. Bernabe Dykes is a 64 year old male who presents as a return patient.    Pt presents today for concerns about spots on trunk and extremities.  Just returned from visiting friends in [place].  They lived on their year-round.  They had a great time.  He has been protecting himself from the sun.      Labs:      Physical exam:  Vitals: There were no vitals taken for this visit.  GEN: well developed, well-nourished, in no acute distress, in a pleasant mood.     SKIN: Harvey phototype 1  - Full skin, which includes the head/face, both arms, chest, back, abdomen,both legs, genitalia and/or groin buttocks, digits and/or nails, was examined.  - Flat brown macules and patches in a sun exposed areas on face and extremities  - No other lesions of concern on areas examined.     Past Medical History:   Past Medical History:   Diagnosis Date    DVT of lower extremity (deep venous thrombosis) (H)     2005 and 2012    HTN (hypertension)     Hyperlipidemia     IGT (impaired glucose tolerance)      Past Surgical  History:   Procedure Laterality Date    BIOPSY  March 2023    COLONOSCOPY  02/17/2014    Procedure: COLONOSCOPY;;  Surgeon: Nathan Olmedo MD;  Location: [facility]    COLONOSCOPY N/A 4/29/2024    Procedure: COLONOSCOPY, FLEXIBLE, WITH LESION REMOVAL USING SNARE;  Surgeon: Tena Bernstein MD;  Location: [facility] OR    COLONOSCOPY WITH CO2 INSUFFLATION N/A 4/29/2024    Procedure: Colonoscopy with CO2 insufflation;  Surgeon: Tena Bernstein MD;  Location: [facility] OR    ORTHOPEDIC SURGERY  01/01/1978    left knee repair    ORTHOPEDIC SURGERY      left wrist repair, metal        Social History:   reports that he has never smoked. He has never used smokeless tobacco. He reports current alcohol use. He reports that he does not use drugs.    Family History:  Family History   Problem Relation Age of Onset    Coronary Artery Disease Father 54        CABG, ICD    Obesity Father     Diabetes Father     Hypertension Father     Diabetes Mother     Obesity Mother     Hypertension Mother     Coronary Artery Disease Brother         Passed away from heart conditions       Medications:  Current Outpatient Medications   Medication Sig Dispense Refill    amLODIPine (NORVASC) 5 MG tablet Take 1 tablet (5 mg) by mouth at bedtime. 90 tablet 3    apixaban ANTICOAGULANT (ELIQUIS) 5 MG tablet Take 1 tablet (5 mg) by mouth 2 times daily. 180 tablet 3    atovaquone-proguanil (MALARONE) 250-100 MG tablet 1 tablet (atovaquone 250 mg/proguanil 100 mg per tablet) once daily; start 1 to 2 days prior to entering a malaria-endemic area, continue throughout the stay and for 7 days after returning 24 tablet 0    hydrochlorothiazide (HYDRODIURIL) 25 MG tablet TAKE 1 TABLET(25 MG) BY MOUTH DAILY 90 tablet 3    rosuvastatin (CRESTOR) 10 MG tablet Take 1 tablet (10 mg) by mouth daily. 90 tablet 3    diclofenac (VOLTAREN) 1 % topical gel Apply 2 g topically 4 times daily. (Patient not taking: Reported on 12/2/2024) 100 g 3     No Known Allergies

## 2025-03-24 NOTE — LETTER
3/24/2025      Bernabe Dykes  [address] [facility] [address]      Dear Colleague,    Thank you for referring your patient, Bernabe Dykes, to the [facility]. Please see a copy of my visit note below.    [facility] Dermatology Note    Encounter Date: Mar 24, 2025    Dermatology Problem List:  #Hx dysplastic nevus with severe atypia, excised April 2023     Major PMHx  -   ______________________________________    Impression/Plan:  Jeremi was seen today for skin check.    Diagnoses and all orders for this visit:    Lentigines  Actinic skin damage  Seborrheic keratoses  - Reviewed the compounding benefits of incremental changes to sun protective behaviors including increased frequency of sunscreen and sun protective clothing like broad brimmed hats and longsleeved UPF containing clothing        Follow-up in 1 year.       Staff Involved:  Staff Only    Jelani Barker MD   of Dermatology  Department of Dermatology  [facility]      CC:   Chief Complaint   Patient presents with     Skin Check     [facility] [place]  No concerns       History of Present Illness:  Mr. Bernabe Dykes is a 64 year old male who presents as a return patient.    Pt presents today for concerns about spots on trunk and extremities.  Just returned from visiting friends in [place].  They lived on their year-round.  They had a great time.  He has been protecting himself from the sun.      Labs:      Physical exam:  Vitals: There were no vitals taken for this visit.  GEN: well developed, well-nourished, in no acute distress, in a pleasant mood.     SKIN: Harvey phototype 1  - Full skin, which includes the head/face, both arms, chest, back, abdomen,both legs, genitalia and/or groin buttocks, digits and/or nails, was examined.  - Flat brown macules and patches in a sun exposed areas on face and extremities  - No other lesions of  concern on areas examined.     Past Medical History:   Past Medical History:   Diagnosis Date     DVT of lower extremity (deep venous thrombosis) (H)     2005 and 2012     HTN (hypertension)      Hyperlipidemia      IGT (impaired glucose tolerance)      Past Surgical History:   Procedure Laterality Date     BIOPSY  March 2023     COLONOSCOPY  02/17/2014    Procedure: COLONOSCOPY;;  Surgeon: Nathan Olmedo MD;  Location:  GI     COLONOSCOPY N/A 4/29/2024    Procedure: COLONOSCOPY, FLEXIBLE, WITH LESION REMOVAL USING SNARE;  Surgeon: Tena Bernstein MD;  Location: [facility] OR     COLONOSCOPY WITH CO2 INSUFFLATION N/A 4/29/2024    Procedure: Colonoscopy with CO2 insufflation;  Surgeon: Tena Bernstein MD;  Location: [facility] OR     ORTHOPEDIC SURGERY  01/01/1978    left knee repair     ORTHOPEDIC SURGERY      left wrist repair, metal        Social History:   reports that he has never smoked. He has never used smokeless tobacco. He reports current alcohol use. He reports that he does not use drugs.    Family History:  Family History   Problem Relation Age of Onset     Coronary Artery Disease Father 54        CABG, ICD     Obesity Father      Diabetes Father      Hypertension Father      Diabetes Mother      Obesity Mother      Hypertension Mother      Coronary Artery Disease Brother         Passed away from heart conditions       Medications:  Current Outpatient Medications   Medication Sig Dispense Refill     amLODIPine (NORVASC) 5 MG tablet Take 1 tablet (5 mg) by mouth at bedtime. 90 tablet 3     apixaban ANTICOAGULANT (ELIQUIS) 5 MG tablet Take 1 tablet (5 mg) by mouth 2 times daily. 180 tablet 3     atovaquone-proguanil (MALARONE) 250-100 MG tablet 1 tablet (atovaquone 250 mg/proguanil 100 mg per tablet) once daily; start 1 to 2 days prior to entering a malaria-endemic area, continue throughout the stay and for 7 days after returning 24 tablet 0     hydrochlorothiazide (HYDRODIURIL) 25 MG tablet TAKE 1 TABLET(25  MG) BY MOUTH DAILY 90 tablet 3     rosuvastatin (CRESTOR) 10 MG tablet Take 1 tablet (10 mg) by mouth daily. 90 tablet 3     diclofenac (VOLTAREN) 1 % topical gel Apply 2 g topically 4 times daily. (Patient not taking: Reported on 12/2/2024) 100 g 3     No Known Allergies              Again, thank you for allowing me to participate in the care of your patient.        Sincerely,        Jelani Barker MD    Electronically signed

## (undated) DEVICE — KIT ENDO FIRST STEP DISINFECTANT 200ML W/POUCH EP-4

## (undated) DEVICE — PAD CHUX UNDERPAD 23X24" 7136

## (undated) DEVICE — PREP CHLORAPREP 26ML TINTED ORANGE  260815

## (undated) RX ORDER — FENTANYL CITRATE 50 UG/ML
INJECTION, SOLUTION INTRAMUSCULAR; INTRAVENOUS
Status: DISPENSED
Start: 2024-04-29